# Patient Record
Sex: FEMALE | Race: OTHER | NOT HISPANIC OR LATINO | URBAN - METROPOLITAN AREA
[De-identification: names, ages, dates, MRNs, and addresses within clinical notes are randomized per-mention and may not be internally consistent; named-entity substitution may affect disease eponyms.]

---

## 2018-07-05 ENCOUNTER — OUTPATIENT (OUTPATIENT)
Dept: OUTPATIENT SERVICES | Facility: HOSPITAL | Age: 33
LOS: 1 days | End: 2018-07-05
Payer: MEDICAID

## 2018-07-05 ENCOUNTER — APPOINTMENT (OUTPATIENT)
Dept: ULTRASOUND IMAGING | Facility: CLINIC | Age: 33
End: 2018-07-05

## 2018-07-05 DIAGNOSIS — Z00.8 ENCOUNTER FOR OTHER GENERAL EXAMINATION: ICD-10-CM

## 2018-07-05 PROCEDURE — 76830 TRANSVAGINAL US NON-OB: CPT | Mod: 26

## 2018-07-05 PROCEDURE — 76856 US EXAM PELVIC COMPLETE: CPT | Mod: 26

## 2018-07-05 PROCEDURE — 76830 TRANSVAGINAL US NON-OB: CPT

## 2018-07-05 PROCEDURE — 76856 US EXAM PELVIC COMPLETE: CPT

## 2019-04-30 ENCOUNTER — EMERGENCY (EMERGENCY)
Facility: HOSPITAL | Age: 34
LOS: 0 days | Discharge: HOME | End: 2019-05-01
Attending: EMERGENCY MEDICINE | Admitting: EMERGENCY MEDICINE
Payer: MEDICAID

## 2019-04-30 VITALS
SYSTOLIC BLOOD PRESSURE: 186 MMHG | RESPIRATION RATE: 18 BRPM | DIASTOLIC BLOOD PRESSURE: 100 MMHG | OXYGEN SATURATION: 98 % | TEMPERATURE: 100 F | HEART RATE: 73 BPM

## 2019-04-30 DIAGNOSIS — R07.89 OTHER CHEST PAIN: ICD-10-CM

## 2019-04-30 DIAGNOSIS — R07.9 CHEST PAIN, UNSPECIFIED: ICD-10-CM

## 2019-04-30 LAB
ALBUMIN SERPL ELPH-MCNC: 4 G/DL — SIGNIFICANT CHANGE UP (ref 3.3–5)
ALP SERPL-CCNC: 110 U/L — SIGNIFICANT CHANGE UP (ref 40–120)
ALT FLD-CCNC: 36 U/L — SIGNIFICANT CHANGE UP (ref 12–78)
ANION GAP SERPL CALC-SCNC: 6 MMOL/L — SIGNIFICANT CHANGE UP (ref 5–17)
APTT BLD: 31.2 SEC — SIGNIFICANT CHANGE UP (ref 27.5–36.3)
AST SERPL-CCNC: 24 U/L — SIGNIFICANT CHANGE UP (ref 15–37)
BILIRUB SERPL-MCNC: 0.3 MG/DL — SIGNIFICANT CHANGE UP (ref 0.2–1.2)
BUN SERPL-MCNC: 11 MG/DL — SIGNIFICANT CHANGE UP (ref 7–23)
CALCIUM SERPL-MCNC: 8.1 MG/DL — LOW (ref 8.5–10.1)
CHLORIDE SERPL-SCNC: 105 MMOL/L — SIGNIFICANT CHANGE UP (ref 96–108)
CO2 SERPL-SCNC: 26 MMOL/L — SIGNIFICANT CHANGE UP (ref 22–31)
CREAT SERPL-MCNC: 0.65 MG/DL — SIGNIFICANT CHANGE UP (ref 0.5–1.3)
GLUCOSE SERPL-MCNC: 100 MG/DL — HIGH (ref 70–99)
HCG SERPL-ACNC: <1 MIU/ML — SIGNIFICANT CHANGE UP
HCT VFR BLD CALC: 38.4 % — SIGNIFICANT CHANGE UP (ref 34.5–45)
HGB BLD-MCNC: 12.4 G/DL — SIGNIFICANT CHANGE UP (ref 11.5–15.5)
INR BLD: 1.05 RATIO — SIGNIFICANT CHANGE UP (ref 0.88–1.16)
LIDOCAIN IGE QN: 154 U/L — SIGNIFICANT CHANGE UP (ref 73–393)
MCHC RBC-ENTMCNC: 28.5 PG — SIGNIFICANT CHANGE UP (ref 27–34)
MCHC RBC-ENTMCNC: 32.3 GM/DL — SIGNIFICANT CHANGE UP (ref 32–36)
MCV RBC AUTO: 88.3 FL — SIGNIFICANT CHANGE UP (ref 80–100)
NRBC # BLD: 0 /100 WBCS — SIGNIFICANT CHANGE UP (ref 0–0)
PLATELET # BLD AUTO: 252 K/UL — SIGNIFICANT CHANGE UP (ref 150–400)
POTASSIUM SERPL-MCNC: 3.5 MMOL/L — SIGNIFICANT CHANGE UP (ref 3.5–5.3)
POTASSIUM SERPL-SCNC: 3.5 MMOL/L — SIGNIFICANT CHANGE UP (ref 3.5–5.3)
PROT SERPL-MCNC: 8.4 GM/DL — HIGH (ref 6–8.3)
PROTHROM AB SERPL-ACNC: 11.7 SEC — SIGNIFICANT CHANGE UP (ref 10–12.9)
RBC # BLD: 4.35 M/UL — SIGNIFICANT CHANGE UP (ref 3.8–5.2)
RBC # FLD: 14.6 % — HIGH (ref 10.3–14.5)
SODIUM SERPL-SCNC: 137 MMOL/L — SIGNIFICANT CHANGE UP (ref 135–145)
TROPONIN I SERPL-MCNC: <0.015 NG/ML — SIGNIFICANT CHANGE UP (ref 0.01–0.04)
WBC # BLD: 9.3 K/UL — SIGNIFICANT CHANGE UP (ref 3.8–10.5)
WBC # FLD AUTO: 9.3 K/UL — SIGNIFICANT CHANGE UP (ref 3.8–10.5)

## 2019-04-30 PROCEDURE — 71046 X-RAY EXAM CHEST 2 VIEWS: CPT | Mod: 26

## 2019-04-30 PROCEDURE — 99285 EMERGENCY DEPT VISIT HI MDM: CPT | Mod: 25

## 2019-04-30 PROCEDURE — 93010 ELECTROCARDIOGRAM REPORT: CPT

## 2019-04-30 RX ORDER — KETOROLAC TROMETHAMINE 30 MG/ML
30 SYRINGE (ML) INJECTION ONCE
Refills: 0 | Status: DISCONTINUED | OUTPATIENT
Start: 2019-04-30 | End: 2019-04-30

## 2019-04-30 NOTE — ED PROVIDER NOTE - OBJECTIVE STATEMENT
32 y/o female in ED c/o left upper chest/shoulder pain x 1 day.   pt states increase pain with movement and palpation.   pt denies any trauma.    states took advil with intermittent relief.   pt denies any fever, HA, sob, n/v/d/abd pain.    tolerating PO.   no sick contacts or recent travel.

## 2019-04-30 NOTE — ED PROVIDER NOTE - MUSCULOSKELETAL MINIMAL EXAM
mild TTP of left chest wall and left shoulder.   decrease ROM of LUE due to pain./RANGE OF MOTION LIMITED/TENDERNESS

## 2019-04-30 NOTE — ED PROVIDER NOTE - PROGRESS NOTE DETAILS
pt and family told of results and likely MS pain.   states feels better and agree with plan for d/c home with scripts and will f/u with PMD.

## 2019-04-30 NOTE — ED PROVIDER NOTE - NSFOLLOWUPCLINICS_GEN_ALL_ED_FT
[facility]  Family Medicine  [address] [place] [address]  Phone: [phone]  Fax:   Follow Up Time:

## 2019-04-30 NOTE — ED PROVIDER NOTE - NSFOLLOWUPINSTRUCTIONS_ED_ALL_ED_FT
please follow up with your doctor in 2-3 days.   take medications as prescribed.   may use ice or heating pads for comfort.   return to ED for any concerns

## 2019-04-30 NOTE — ED ADULT TRIAGE NOTE - CHIEF COMPLAINT QUOTE
pt c/o left sided chest and shoulder pain starting around noon today. denies SOB, palpitations, trauma. pt took Advil at 5p

## 2019-04-30 NOTE — ED PROVIDER NOTE - CLINICAL SUMMARY MEDICAL DECISION MAKING FREE TEXT BOX
pt with left sided CP and shoulder pain possible cardiac vs MS pain.   will check EKG, CXR, labs, meds, UA and reeval

## 2019-05-01 VITALS
OXYGEN SATURATION: 100 % | RESPIRATION RATE: 17 BRPM | SYSTOLIC BLOOD PRESSURE: 122 MMHG | HEART RATE: 75 BPM | DIASTOLIC BLOOD PRESSURE: 70 MMHG

## 2019-05-01 LAB
APPEARANCE UR: CLEAR — SIGNIFICANT CHANGE UP
BACTERIA # UR AUTO: NEGATIVE — SIGNIFICANT CHANGE UP
BILIRUB UR-MCNC: NEGATIVE — SIGNIFICANT CHANGE UP
COLOR SPEC: YELLOW — SIGNIFICANT CHANGE UP
DIFF PNL FLD: ABNORMAL
EPI CELLS # UR: SIGNIFICANT CHANGE UP
GLUCOSE UR QL: NEGATIVE MG/DL — SIGNIFICANT CHANGE UP
KETONES UR-MCNC: NEGATIVE — SIGNIFICANT CHANGE UP
LEUKOCYTE ESTERASE UR-ACNC: NEGATIVE — SIGNIFICANT CHANGE UP
NITRITE UR-MCNC: NEGATIVE — SIGNIFICANT CHANGE UP
PH UR: 6.5 — SIGNIFICANT CHANGE UP (ref 5–8)
PROT UR-MCNC: 15 MG/DL
RBC CASTS # UR COMP ASSIST: SIGNIFICANT CHANGE UP /HPF (ref 0–4)
SP GR SPEC: 1.01 — SIGNIFICANT CHANGE UP (ref 1.01–1.02)
UROBILINOGEN FLD QL: NEGATIVE MG/DL — SIGNIFICANT CHANGE UP
WBC UR QL: SIGNIFICANT CHANGE UP

## 2019-05-01 RX ORDER — IBUPROFEN 200 MG
1 TABLET ORAL
Qty: 40 | Refills: 0
Start: 2019-05-01 | End: 2019-05-10

## 2019-05-01 RX ORDER — CYCLOBENZAPRINE HYDROCHLORIDE 10 MG/1
1 TABLET, FILM COATED ORAL
Qty: 15 | Refills: 0
Start: 2019-05-01 | End: 2019-05-05

## 2019-05-01 RX ADMIN — Medication 30 MILLIGRAM(S): at 00:20

## 2019-05-01 NOTE — ED ADULT NURSE NOTE - OBJECTIVE STATEMENT
pt presents to ed c/o left shoulder/chest pain x 1 . pt endorses pain while moving. pt denies any trauma. pt denies SOB. denies nausea/denies vomiting. pt ambulatory, vitals stable and normotensive. pt alert and orientated x 3. respirations even and unlabored.

## 2019-05-01 NOTE — ED ADULT NURSE NOTE - NSIMPLEMENTINTERV_GEN_ALL_ED
Implemented All Universal Safety Interventions:  [place] to call system. Call bell, personal items and telephone within reach. Instruct patient to call for assistance. Room bathroom lighting operational. Non-slip footwear when patient is off stretcher. Physically safe environment: no spills, clutter or unnecessary equipment. Stretcher in lowest position, wheels locked, appropriate side rails in place.

## 2019-10-29 ENCOUNTER — RESULT REVIEW (OUTPATIENT)
Age: 34
End: 2019-10-29

## 2019-11-15 ENCOUNTER — RESULT REVIEW (OUTPATIENT)
Age: 34
End: 2019-11-15

## 2020-01-28 ENCOUNTER — APPOINTMENT (OUTPATIENT)
Dept: ANTEPARTUM | Facility: CLINIC | Age: 35
End: 2020-01-28

## 2020-02-11 ENCOUNTER — ASOB RESULT (OUTPATIENT)
Age: 35
End: 2020-02-11

## 2020-02-11 ENCOUNTER — APPOINTMENT (OUTPATIENT)
Dept: ANTEPARTUM | Facility: CLINIC | Age: 35
End: 2020-02-11
Payer: MEDICAID

## 2020-02-11 PROCEDURE — 99201 OFFICE OUTPATIENT NEW 10 MINUTES: CPT | Mod: 25,TH

## 2020-02-11 PROCEDURE — 76813 OB US NUCHAL MEAS 1 GEST: CPT

## 2020-02-11 PROCEDURE — 76801 OB US < 14 WKS SINGLE FETUS: CPT

## 2020-03-31 ENCOUNTER — ASOB RESULT (OUTPATIENT)
Age: 35
End: 2020-03-31

## 2020-03-31 ENCOUNTER — APPOINTMENT (OUTPATIENT)
Dept: ANTEPARTUM | Facility: CLINIC | Age: 35
End: 2020-03-31
Payer: MEDICAID

## 2020-03-31 PROCEDURE — 76811 OB US DETAILED SNGL FETUS: CPT

## 2020-03-31 PROCEDURE — 99213 OFFICE O/P EST LOW 20 MIN: CPT | Mod: TH

## 2020-04-15 ENCOUNTER — EMERGENCY (EMERGENCY)
Facility: HOSPITAL | Age: 35
LOS: 0 days | Discharge: HOME | End: 2020-04-15
Payer: MEDICAID

## 2020-04-15 VITALS
TEMPERATURE: 99 F | RESPIRATION RATE: 18 BRPM | HEART RATE: 107 BPM | DIASTOLIC BLOOD PRESSURE: 66 MMHG | SYSTOLIC BLOOD PRESSURE: 120 MMHG

## 2020-04-15 DIAGNOSIS — R05 COUGH: ICD-10-CM

## 2020-04-15 DIAGNOSIS — R50.9 FEVER, UNSPECIFIED: ICD-10-CM

## 2020-04-15 DIAGNOSIS — U07.1 COVID-19: ICD-10-CM

## 2020-04-15 PROCEDURE — 99283 EMERGENCY DEPT VISIT LOW MDM: CPT

## 2020-04-15 PROCEDURE — 87635 SARS-COV-2 COVID-19 AMP PRB: CPT

## 2020-04-15 NOTE — ED STATDOCS - OBJECTIVE STATEMENT
35 yo female presents to ED with fever t max 100.3-100.5F, cough, body aches x 7 days. No recent exposure. Pt here for testing.

## 2020-04-15 NOTE — ED ADULT NURSE NOTE - OBJECTIVE STATEMENT
Patient evaluated, treated, and discharged by PA. Refer to PA note for assessment.  Discharge instructions given verbally and understood by patient. Self-quarantine and COVID-19 information provided to patient. Unable to sign secondary to COVID-19 PUI.  Patient provides verbal consent to receive results via text or email.

## 2020-04-15 NOTE — ED STATDOCS - PATIENT PORTAL LINK FT
You can access the FollowMyHealth Patient Portal offered by [facility] by registering at the following website: [email or web address]. By joining [facility]’s FollowMyHealth portal, you will also be able to view your health information using other applications (apps) compatible with our system.

## 2020-04-16 LAB — SARS-COV-2 RNA SPEC QL NAA+PROBE: DETECTED

## 2020-06-09 ENCOUNTER — APPOINTMENT (OUTPATIENT)
Dept: ANTEPARTUM | Facility: CLINIC | Age: 35
End: 2020-06-09
Payer: MEDICAID

## 2020-06-09 ENCOUNTER — ASOB RESULT (OUTPATIENT)
Age: 35
End: 2020-06-09

## 2020-06-09 PROCEDURE — 76816 OB US FOLLOW-UP PER FETUS: CPT

## 2020-06-09 PROCEDURE — 76820 UMBILICAL ARTERY ECHO: CPT

## 2020-06-30 ENCOUNTER — ASOB RESULT (OUTPATIENT)
Age: 35
End: 2020-06-30

## 2020-06-30 ENCOUNTER — APPOINTMENT (OUTPATIENT)
Dept: ANTEPARTUM | Facility: CLINIC | Age: 35
End: 2020-06-30
Payer: MEDICAID

## 2020-06-30 PROCEDURE — 76820 UMBILICAL ARTERY ECHO: CPT

## 2020-06-30 PROCEDURE — 76816 OB US FOLLOW-UP PER FETUS: CPT

## 2020-07-13 ENCOUNTER — INPATIENT (INPATIENT)
Facility: HOSPITAL | Age: 35
LOS: 3 days | Discharge: HOME | DRG: 540 | End: 2020-07-17
Attending: OBSTETRICS & GYNECOLOGY | Admitting: OBSTETRICS & GYNECOLOGY
Payer: MEDICAID

## 2020-07-13 VITALS — WEIGHT: 211.64 LBS | HEIGHT: 61 IN

## 2020-07-13 DIAGNOSIS — O26.899 OTHER SPECIFIED PREGNANCY RELATED CONDITIONS, UNSPECIFIED TRIMESTER: ICD-10-CM

## 2020-07-13 LAB
ALBUMIN SERPL ELPH-MCNC: 2.8 G/DL — LOW (ref 3.3–5)
ALBUMIN SERPL ELPH-MCNC: 2.8 G/DL — LOW (ref 3.3–5)
ALP SERPL-CCNC: 100 U/L — SIGNIFICANT CHANGE UP (ref 40–120)
ALP SERPL-CCNC: 97 U/L — SIGNIFICANT CHANGE UP (ref 40–120)
ALT FLD-CCNC: 16 U/L — SIGNIFICANT CHANGE UP (ref 12–78)
ALT FLD-CCNC: 22 U/L — SIGNIFICANT CHANGE UP (ref 12–78)
ANION GAP SERPL CALC-SCNC: 6 MMOL/L — SIGNIFICANT CHANGE UP (ref 5–17)
ANION GAP SERPL CALC-SCNC: 7 MMOL/L — SIGNIFICANT CHANGE UP (ref 5–17)
APTT BLD: 25.8 SEC — LOW (ref 27.5–35.5)
APTT BLD: 26.2 SEC — LOW (ref 27.5–35.5)
AST SERPL-CCNC: 14 U/L — LOW (ref 15–37)
AST SERPL-CCNC: 15 U/L — SIGNIFICANT CHANGE UP (ref 15–37)
BASOPHILS # BLD AUTO: 0.01 K/UL — SIGNIFICANT CHANGE UP (ref 0–0.2)
BASOPHILS # BLD AUTO: 0.01 K/UL — SIGNIFICANT CHANGE UP (ref 0–0.2)
BASOPHILS NFR BLD AUTO: 0.1 % — SIGNIFICANT CHANGE UP (ref 0–2)
BASOPHILS NFR BLD AUTO: 0.1 % — SIGNIFICANT CHANGE UP (ref 0–2)
BILIRUB SERPL-MCNC: 0.1 MG/DL — LOW (ref 0.2–1.2)
BILIRUB SERPL-MCNC: 0.2 MG/DL — SIGNIFICANT CHANGE UP (ref 0.2–1.2)
BUN SERPL-MCNC: 11 MG/DL — SIGNIFICANT CHANGE UP (ref 7–23)
BUN SERPL-MCNC: 7 MG/DL — SIGNIFICANT CHANGE UP (ref 7–23)
CALCIUM SERPL-MCNC: 7.9 MG/DL — LOW (ref 8.5–10.1)
CALCIUM SERPL-MCNC: 8.3 MG/DL — LOW (ref 8.5–10.1)
CHLORIDE SERPL-SCNC: 107 MMOL/L — SIGNIFICANT CHANGE UP (ref 96–108)
CHLORIDE SERPL-SCNC: 108 MMOL/L — SIGNIFICANT CHANGE UP (ref 96–108)
CO2 SERPL-SCNC: 22 MMOL/L — SIGNIFICANT CHANGE UP (ref 22–31)
CO2 SERPL-SCNC: 23 MMOL/L — SIGNIFICANT CHANGE UP (ref 22–31)
CREAT ?TM UR-MCNC: 19 MG/DL — SIGNIFICANT CHANGE UP
CREAT SERPL-MCNC: 0.43 MG/DL — LOW (ref 0.5–1.3)
CREAT SERPL-MCNC: 0.54 MG/DL — SIGNIFICANT CHANGE UP (ref 0.5–1.3)
EOSINOPHIL # BLD AUTO: 0 K/UL — SIGNIFICANT CHANGE UP (ref 0–0.5)
EOSINOPHIL # BLD AUTO: 0.04 K/UL — SIGNIFICANT CHANGE UP (ref 0–0.5)
EOSINOPHIL NFR BLD AUTO: 0 % — SIGNIFICANT CHANGE UP (ref 0–6)
EOSINOPHIL NFR BLD AUTO: 0.4 % — SIGNIFICANT CHANGE UP (ref 0–6)
FIBRINOGEN PPP-MCNC: 644 MG/DL — HIGH (ref 290–520)
GLUCOSE SERPL-MCNC: 133 MG/DL — HIGH (ref 70–99)
GLUCOSE SERPL-MCNC: 66 MG/DL — LOW (ref 70–99)
HCT VFR BLD CALC: 33 % — LOW (ref 34.5–45)
HCT VFR BLD CALC: 33.9 % — LOW (ref 34.5–45)
HGB BLD-MCNC: 11.3 G/DL — LOW (ref 11.5–15.5)
HGB BLD-MCNC: 11.5 G/DL — SIGNIFICANT CHANGE UP (ref 11.5–15.5)
IMM GRANULOCYTES NFR BLD AUTO: 0.4 % — SIGNIFICANT CHANGE UP (ref 0–1.5)
IMM GRANULOCYTES NFR BLD AUTO: 0.8 % — SIGNIFICANT CHANGE UP (ref 0–1.5)
INR BLD: 0.92 RATIO — SIGNIFICANT CHANGE UP (ref 0.88–1.16)
INR BLD: 0.94 RATIO — SIGNIFICANT CHANGE UP (ref 0.88–1.16)
LDH SERPL L TO P-CCNC: 193 U/L — SIGNIFICANT CHANGE UP (ref 84–241)
LYMPHOCYTES # BLD AUTO: 0.71 K/UL — LOW (ref 1–3.3)
LYMPHOCYTES # BLD AUTO: 1.41 K/UL — SIGNIFICANT CHANGE UP (ref 1–3.3)
LYMPHOCYTES # BLD AUTO: 15.8 % — SIGNIFICANT CHANGE UP (ref 13–44)
LYMPHOCYTES # BLD AUTO: 6 % — LOW (ref 13–44)
MCHC RBC-ENTMCNC: 30 PG — SIGNIFICANT CHANGE UP (ref 27–34)
MCHC RBC-ENTMCNC: 30.3 PG — SIGNIFICANT CHANGE UP (ref 27–34)
MCHC RBC-ENTMCNC: 33.9 GM/DL — SIGNIFICANT CHANGE UP (ref 32–36)
MCHC RBC-ENTMCNC: 34.2 GM/DL — SIGNIFICANT CHANGE UP (ref 32–36)
MCV RBC AUTO: 88.5 FL — SIGNIFICANT CHANGE UP (ref 80–100)
MCV RBC AUTO: 88.5 FL — SIGNIFICANT CHANGE UP (ref 80–100)
MONOCYTES # BLD AUTO: 0.19 K/UL — SIGNIFICANT CHANGE UP (ref 0–0.9)
MONOCYTES # BLD AUTO: 0.83 K/UL — SIGNIFICANT CHANGE UP (ref 0–0.9)
MONOCYTES NFR BLD AUTO: 1.6 % — LOW (ref 2–14)
MONOCYTES NFR BLD AUTO: 9.3 % — SIGNIFICANT CHANGE UP (ref 2–14)
NEUTROPHILS # BLD AUTO: 10.76 K/UL — HIGH (ref 1.8–7.4)
NEUTROPHILS # BLD AUTO: 6.62 K/UL — SIGNIFICANT CHANGE UP (ref 1.8–7.4)
NEUTROPHILS NFR BLD AUTO: 74 % — SIGNIFICANT CHANGE UP (ref 43–77)
NEUTROPHILS NFR BLD AUTO: 91.5 % — HIGH (ref 43–77)
PLATELET # BLD AUTO: 199 K/UL — SIGNIFICANT CHANGE UP (ref 150–400)
PLATELET # BLD AUTO: 205 K/UL — SIGNIFICANT CHANGE UP (ref 150–400)
POTASSIUM SERPL-MCNC: 3.9 MMOL/L — SIGNIFICANT CHANGE UP (ref 3.5–5.3)
POTASSIUM SERPL-MCNC: 4 MMOL/L — SIGNIFICANT CHANGE UP (ref 3.5–5.3)
POTASSIUM SERPL-SCNC: 3.9 MMOL/L — SIGNIFICANT CHANGE UP (ref 3.5–5.3)
POTASSIUM SERPL-SCNC: 4 MMOL/L — SIGNIFICANT CHANGE UP (ref 3.5–5.3)
PROT ?TM UR-MCNC: 42 MG/DL — HIGH (ref 0–12)
PROT SERPL-MCNC: 7.1 GM/DL — SIGNIFICANT CHANGE UP (ref 6–8.3)
PROT SERPL-MCNC: 7.1 GM/DL — SIGNIFICANT CHANGE UP (ref 6–8.3)
PROT/CREAT UR-RTO: 2.2 RATIO — HIGH (ref 0–0.2)
PROTHROM AB SERPL-ACNC: 10.8 SEC — SIGNIFICANT CHANGE UP (ref 10.6–13.6)
PROTHROM AB SERPL-ACNC: 11 SEC — SIGNIFICANT CHANGE UP (ref 10.6–13.6)
RBC # BLD: 3.73 M/UL — LOW (ref 3.8–5.2)
RBC # BLD: 3.83 M/UL — SIGNIFICANT CHANGE UP (ref 3.8–5.2)
RBC # FLD: 14.2 % — SIGNIFICANT CHANGE UP (ref 10.3–14.5)
RBC # FLD: 14.3 % — SIGNIFICANT CHANGE UP (ref 10.3–14.5)
SARS-COV-2 RNA SPEC QL NAA+PROBE: SIGNIFICANT CHANGE UP
SODIUM SERPL-SCNC: 136 MMOL/L — SIGNIFICANT CHANGE UP (ref 135–145)
SODIUM SERPL-SCNC: 137 MMOL/L — SIGNIFICANT CHANGE UP (ref 135–145)
T PALLIDUM AB TITR SER: NEGATIVE — SIGNIFICANT CHANGE UP
URATE SERPL-MCNC: 4 MG/DL — SIGNIFICANT CHANGE UP (ref 2.5–7)
URATE SERPL-MCNC: 4.9 MG/DL — SIGNIFICANT CHANGE UP (ref 2.5–7)
WBC # BLD: 11.77 K/UL — HIGH (ref 3.8–10.5)
WBC # BLD: 8.95 K/UL — SIGNIFICANT CHANGE UP (ref 3.8–10.5)
WBC # FLD AUTO: 11.77 K/UL — HIGH (ref 3.8–10.5)
WBC # FLD AUTO: 8.95 K/UL — SIGNIFICANT CHANGE UP (ref 3.8–10.5)

## 2020-07-13 PROCEDURE — 84156 ASSAY OF PROTEIN URINE: CPT

## 2020-07-13 PROCEDURE — 80053 COMPREHEN METABOLIC PANEL: CPT

## 2020-07-13 PROCEDURE — 86901 BLOOD TYPING SEROLOGIC RH(D): CPT

## 2020-07-13 PROCEDURE — 85384 FIBRINOGEN ACTIVITY: CPT

## 2020-07-13 PROCEDURE — 85730 THROMBOPLASTIN TIME PARTIAL: CPT

## 2020-07-13 PROCEDURE — 85610 PROTHROMBIN TIME: CPT

## 2020-07-13 PROCEDURE — 86850 RBC ANTIBODY SCREEN: CPT

## 2020-07-13 PROCEDURE — 59050 FETAL MONITOR W/REPORT: CPT

## 2020-07-13 PROCEDURE — 84550 ASSAY OF BLOOD/URIC ACID: CPT

## 2020-07-13 PROCEDURE — 94760 N-INVAS EAR/PLS OXIMETRY 1: CPT

## 2020-07-13 PROCEDURE — 83615 LACTATE (LD) (LDH) ENZYME: CPT

## 2020-07-13 PROCEDURE — 86780 TREPONEMA PALLIDUM: CPT

## 2020-07-13 PROCEDURE — 82570 ASSAY OF URINE CREATININE: CPT

## 2020-07-13 PROCEDURE — 85025 COMPLETE CBC W/AUTO DIFF WBC: CPT

## 2020-07-13 PROCEDURE — 83735 ASSAY OF MAGNESIUM: CPT

## 2020-07-13 PROCEDURE — 87653 STREP B DNA AMP PROBE: CPT

## 2020-07-13 PROCEDURE — 36415 COLL VENOUS BLD VENIPUNCTURE: CPT

## 2020-07-13 PROCEDURE — G0463: CPT

## 2020-07-13 PROCEDURE — 87635 SARS-COV-2 COVID-19 AMP PRB: CPT

## 2020-07-13 PROCEDURE — 86900 BLOOD TYPING SEROLOGIC ABO: CPT

## 2020-07-13 PROCEDURE — 86769 SARS-COV-2 COVID-19 ANTIBODY: CPT

## 2020-07-13 PROCEDURE — 87081 CULTURE SCREEN ONLY: CPT

## 2020-07-13 RX ORDER — LABETALOL HCL 100 MG
20 TABLET ORAL ONCE
Refills: 0 | Status: COMPLETED | OUTPATIENT
Start: 2020-07-13 | End: 2020-07-13

## 2020-07-13 RX ORDER — ACETAMINOPHEN 500 MG
975 TABLET ORAL EVERY 8 HOURS
Refills: 0 | Status: DISCONTINUED | OUTPATIENT
Start: 2020-07-13 | End: 2020-07-17

## 2020-07-13 RX ORDER — LABETALOL HCL 100 MG
200 TABLET ORAL EVERY 8 HOURS
Refills: 0 | Status: DISCONTINUED | OUTPATIENT
Start: 2020-07-13 | End: 2020-07-13

## 2020-07-13 RX ORDER — SODIUM CHLORIDE 9 MG/ML
1000 INJECTION, SOLUTION INTRAVENOUS
Refills: 0 | Status: DISCONTINUED | OUTPATIENT
Start: 2020-07-13 | End: 2020-07-15

## 2020-07-13 RX ORDER — MAGNESIUM SULFATE 500 MG/ML
2 VIAL (ML) INJECTION
Qty: 40 | Refills: 0 | Status: DISCONTINUED | OUTPATIENT
Start: 2020-07-13 | End: 2020-07-15

## 2020-07-13 RX ORDER — DINOPROSTONE 10 MG/241MG
10 INSERT VAGINAL ONCE
Refills: 0 | Status: COMPLETED | OUTPATIENT
Start: 2020-07-13 | End: 2020-07-13

## 2020-07-13 RX ORDER — CITRIC ACID/SODIUM CITRATE 300-500 MG
30 SOLUTION, ORAL ORAL EVERY 6 HOURS
Refills: 0 | Status: DISCONTINUED | OUTPATIENT
Start: 2020-07-13 | End: 2020-07-15

## 2020-07-13 RX ORDER — OXYTOCIN 10 UNIT/ML
333.33 VIAL (ML) INJECTION
Qty: 20 | Refills: 0 | Status: DISCONTINUED | OUTPATIENT
Start: 2020-07-13 | End: 2020-07-17

## 2020-07-13 RX ORDER — MAGNESIUM SULFATE 500 MG/ML
4 VIAL (ML) INJECTION ONCE
Refills: 0 | Status: COMPLETED | OUTPATIENT
Start: 2020-07-13 | End: 2020-07-13

## 2020-07-13 RX ADMIN — Medication 50 GM/HR: at 15:52

## 2020-07-13 RX ADMIN — Medication 200 MILLIGRAM(S): at 21:39

## 2020-07-13 RX ADMIN — Medication 325 MILLIGRAM(S): at 20:00

## 2020-07-13 RX ADMIN — Medication 12 MILLIGRAM(S): at 15:02

## 2020-07-13 RX ADMIN — Medication 200 GRAM(S): at 13:35

## 2020-07-13 RX ADMIN — SODIUM CHLORIDE 75 MILLILITER(S): 9 INJECTION, SOLUTION INTRAVENOUS at 15:51

## 2020-07-13 RX ADMIN — Medication 20 MILLIGRAM(S): at 13:33

## 2020-07-13 RX ADMIN — Medication 20 MILLIGRAM(S): at 13:03

## 2020-07-14 LAB
COLLECT DURATION TIME UR: 24 HR — SIGNIFICANT CHANGE UP
MAGNESIUM SERPL-MCNC: 4.7 MG/DL — HIGH (ref 1.6–2.6)
MAGNESIUM SERPL-MCNC: 4.8 MG/DL — HIGH (ref 1.6–2.6)
MAGNESIUM SERPL-MCNC: 4.9 MG/DL — HIGH (ref 1.6–2.6)
PROT 24H UR-MRATE: 800 MG/24 H — HIGH (ref 50–100)
TOTAL VOLUME - 24 HOUR: 1380 ML — SIGNIFICANT CHANGE UP
URINE CREATININE CALCULATION: 1.2 G/24 H — SIGNIFICANT CHANGE UP (ref 0.8–1.8)

## 2020-07-14 RX ORDER — LABETALOL HCL 100 MG
20 TABLET ORAL ONCE
Refills: 0 | Status: COMPLETED | OUTPATIENT
Start: 2020-07-14 | End: 2020-07-14

## 2020-07-14 RX ADMIN — Medication 975 MILLIGRAM(S): at 05:42

## 2020-07-14 RX ADMIN — Medication 50 GM/HR: at 19:48

## 2020-07-14 RX ADMIN — Medication 50 GM/HR: at 09:14

## 2020-07-14 RX ADMIN — Medication 20 MILLIGRAM(S): at 10:52

## 2020-07-14 RX ADMIN — DINOPROSTONE 10 MILLIGRAM(S): 10 INSERT VAGINAL at 00:55

## 2020-07-14 RX ADMIN — Medication 12 MILLIGRAM(S): at 16:33

## 2020-07-14 RX ADMIN — SODIUM CHLORIDE 75 MILLILITER(S): 9 INJECTION, SOLUTION INTRAVENOUS at 19:48

## 2020-07-14 RX ADMIN — SODIUM CHLORIDE 75 MILLILITER(S): 9 INJECTION, SOLUTION INTRAVENOUS at 00:49

## 2020-07-15 LAB
ALBUMIN SERPL ELPH-MCNC: 2.8 G/DL — LOW (ref 3.3–5)
ALP SERPL-CCNC: 99 U/L — SIGNIFICANT CHANGE UP (ref 40–120)
ALT FLD-CCNC: 20 U/L — SIGNIFICANT CHANGE UP (ref 12–78)
ANION GAP SERPL CALC-SCNC: 7 MMOL/L — SIGNIFICANT CHANGE UP (ref 5–17)
APTT BLD: 23.7 SEC — LOW (ref 27.5–35.5)
AST SERPL-CCNC: 15 U/L — SIGNIFICANT CHANGE UP (ref 15–37)
BASOPHILS # BLD AUTO: 0.02 K/UL — SIGNIFICANT CHANGE UP (ref 0–0.2)
BASOPHILS # BLD AUTO: 0.02 K/UL — SIGNIFICANT CHANGE UP (ref 0–0.2)
BASOPHILS NFR BLD AUTO: 0.1 % — SIGNIFICANT CHANGE UP (ref 0–2)
BASOPHILS NFR BLD AUTO: 0.2 % — SIGNIFICANT CHANGE UP (ref 0–2)
BILIRUB SERPL-MCNC: 0.1 MG/DL — LOW (ref 0.2–1.2)
BUN SERPL-MCNC: 12 MG/DL — SIGNIFICANT CHANGE UP (ref 7–23)
CALCIUM SERPL-MCNC: 7.2 MG/DL — LOW (ref 8.5–10.1)
CHLORIDE SERPL-SCNC: 107 MMOL/L — SIGNIFICANT CHANGE UP (ref 96–108)
CO2 SERPL-SCNC: 22 MMOL/L — SIGNIFICANT CHANGE UP (ref 22–31)
CREAT SERPL-MCNC: 0.66 MG/DL — SIGNIFICANT CHANGE UP (ref 0.5–1.3)
EOSINOPHIL # BLD AUTO: 0 K/UL — SIGNIFICANT CHANGE UP (ref 0–0.5)
EOSINOPHIL # BLD AUTO: 0.01 K/UL — SIGNIFICANT CHANGE UP (ref 0–0.5)
EOSINOPHIL NFR BLD AUTO: 0 % — SIGNIFICANT CHANGE UP (ref 0–6)
EOSINOPHIL NFR BLD AUTO: 0.1 % — SIGNIFICANT CHANGE UP (ref 0–6)
FIBRINOGEN PPP-MCNC: 618 MG/DL — HIGH (ref 290–520)
GLUCOSE SERPL-MCNC: 116 MG/DL — HIGH (ref 70–99)
HCT VFR BLD CALC: 32.1 % — LOW (ref 34.5–45)
HCT VFR BLD CALC: 33.7 % — LOW (ref 34.5–45)
HGB BLD-MCNC: 10.5 G/DL — LOW (ref 11.5–15.5)
HGB BLD-MCNC: 11.3 G/DL — LOW (ref 11.5–15.5)
IMM GRANULOCYTES NFR BLD AUTO: 0.8 % — SIGNIFICANT CHANGE UP (ref 0–1.5)
IMM GRANULOCYTES NFR BLD AUTO: 0.8 % — SIGNIFICANT CHANGE UP (ref 0–1.5)
INR BLD: 0.89 RATIO — SIGNIFICANT CHANGE UP (ref 0.88–1.16)
LDH SERPL L TO P-CCNC: 189 U/L — SIGNIFICANT CHANGE UP (ref 84–241)
LYMPHOCYTES # BLD AUTO: 0.89 K/UL — LOW (ref 1–3.3)
LYMPHOCYTES # BLD AUTO: 1 K/UL — SIGNIFICANT CHANGE UP (ref 1–3.3)
LYMPHOCYTES # BLD AUTO: 6.3 % — LOW (ref 13–44)
LYMPHOCYTES # BLD AUTO: 6.9 % — LOW (ref 13–44)
MAGNESIUM SERPL-MCNC: 4.6 MG/DL — HIGH (ref 1.6–2.6)
MAGNESIUM SERPL-MCNC: 4.7 MG/DL — HIGH (ref 1.6–2.6)
MAGNESIUM SERPL-MCNC: 4.8 MG/DL — HIGH (ref 1.6–2.6)
MAGNESIUM SERPL-MCNC: 4.9 MG/DL — HIGH (ref 1.6–2.6)
MCHC RBC-ENTMCNC: 30 PG — SIGNIFICANT CHANGE UP (ref 27–34)
MCHC RBC-ENTMCNC: 30.2 PG — SIGNIFICANT CHANGE UP (ref 27–34)
MCHC RBC-ENTMCNC: 32.7 GM/DL — SIGNIFICANT CHANGE UP (ref 32–36)
MCHC RBC-ENTMCNC: 33.5 GM/DL — SIGNIFICANT CHANGE UP (ref 32–36)
MCV RBC AUTO: 90.1 FL — SIGNIFICANT CHANGE UP (ref 80–100)
MCV RBC AUTO: 91.7 FL — SIGNIFICANT CHANGE UP (ref 80–100)
MONOCYTES # BLD AUTO: 0.67 K/UL — SIGNIFICANT CHANGE UP (ref 0–0.9)
MONOCYTES # BLD AUTO: 1.42 K/UL — HIGH (ref 0–0.9)
MONOCYTES NFR BLD AUTO: 5.2 % — SIGNIFICANT CHANGE UP (ref 2–14)
MONOCYTES NFR BLD AUTO: 9 % — SIGNIFICANT CHANGE UP (ref 2–14)
NEUTROPHILS # BLD AUTO: 11.28 K/UL — HIGH (ref 1.8–7.4)
NEUTROPHILS # BLD AUTO: 13.22 K/UL — HIGH (ref 1.8–7.4)
NEUTROPHILS NFR BLD AUTO: 83.7 % — HIGH (ref 43–77)
NEUTROPHILS NFR BLD AUTO: 86.9 % — HIGH (ref 43–77)
PLATELET # BLD AUTO: 201 K/UL — SIGNIFICANT CHANGE UP (ref 150–400)
PLATELET # BLD AUTO: 211 K/UL — SIGNIFICANT CHANGE UP (ref 150–400)
POTASSIUM SERPL-MCNC: 4.6 MMOL/L — SIGNIFICANT CHANGE UP (ref 3.5–5.3)
POTASSIUM SERPL-SCNC: 4.6 MMOL/L — SIGNIFICANT CHANGE UP (ref 3.5–5.3)
PROT SERPL-MCNC: 7.3 GM/DL — SIGNIFICANT CHANGE UP (ref 6–8.3)
PROTHROM AB SERPL-ACNC: 10.4 SEC — LOW (ref 10.6–13.6)
RBC # BLD: 3.5 M/UL — LOW (ref 3.8–5.2)
RBC # BLD: 3.74 M/UL — LOW (ref 3.8–5.2)
RBC # FLD: 14.5 % — SIGNIFICANT CHANGE UP (ref 10.3–14.5)
RBC # FLD: 14.7 % — HIGH (ref 10.3–14.5)
SODIUM SERPL-SCNC: 136 MMOL/L — SIGNIFICANT CHANGE UP (ref 135–145)
URATE SERPL-MCNC: 5.1 MG/DL — SIGNIFICANT CHANGE UP (ref 2.5–7)
WBC # BLD: 12.97 K/UL — HIGH (ref 3.8–10.5)
WBC # BLD: 15.79 K/UL — HIGH (ref 3.8–10.5)
WBC # FLD AUTO: 12.97 K/UL — HIGH (ref 3.8–10.5)
WBC # FLD AUTO: 15.79 K/UL — HIGH (ref 3.8–10.5)

## 2020-07-15 RX ORDER — IBUPROFEN 200 MG
600 TABLET ORAL EVERY 6 HOURS
Refills: 0 | Status: COMPLETED | OUTPATIENT
Start: 2020-07-15 | End: 2021-06-13

## 2020-07-15 RX ORDER — LABETALOL HCL 100 MG
40 TABLET ORAL ONCE
Refills: 0 | Status: COMPLETED | OUTPATIENT
Start: 2020-07-15 | End: 2020-07-15

## 2020-07-15 RX ORDER — LABETALOL HCL 100 MG
200 TABLET ORAL EVERY 8 HOURS
Refills: 0 | Status: DISCONTINUED | OUTPATIENT
Start: 2020-07-15 | End: 2020-07-17

## 2020-07-15 RX ORDER — MAGNESIUM SULFATE 500 MG/ML
2 VIAL (ML) INJECTION
Qty: 40 | Refills: 0 | Status: DISCONTINUED | OUTPATIENT
Start: 2020-07-15 | End: 2020-07-16

## 2020-07-15 RX ORDER — LANOLIN
1 OINTMENT (GRAM) TOPICAL EVERY 6 HOURS
Refills: 0 | Status: DISCONTINUED | OUTPATIENT
Start: 2020-07-15 | End: 2020-07-17

## 2020-07-15 RX ORDER — SODIUM CHLORIDE 9 MG/ML
1000 INJECTION, SOLUTION INTRAVENOUS
Refills: 0 | Status: DISCONTINUED | OUTPATIENT
Start: 2020-07-15 | End: 2020-07-17

## 2020-07-15 RX ORDER — LABETALOL HCL 100 MG
20 TABLET ORAL ONCE
Refills: 0 | Status: COMPLETED | OUTPATIENT
Start: 2020-07-15 | End: 2020-07-15

## 2020-07-15 RX ORDER — ACETAMINOPHEN 500 MG
1000 TABLET ORAL ONCE
Refills: 0 | Status: COMPLETED | OUTPATIENT
Start: 2020-07-15 | End: 2020-07-15

## 2020-07-15 RX ORDER — OXYTOCIN 10 UNIT/ML
333.33 VIAL (ML) INJECTION
Qty: 20 | Refills: 0 | Status: DISCONTINUED | OUTPATIENT
Start: 2020-07-15 | End: 2020-07-17

## 2020-07-15 RX ORDER — MORPHINE SULFATE 50 MG/1
4 CAPSULE, EXTENDED RELEASE ORAL
Refills: 0 | Status: DISCONTINUED | OUTPATIENT
Start: 2020-07-15 | End: 2020-07-17

## 2020-07-15 RX ORDER — DIPHENHYDRAMINE HCL 50 MG
25 CAPSULE ORAL EVERY 6 HOURS
Refills: 0 | Status: DISCONTINUED | OUTPATIENT
Start: 2020-07-15 | End: 2020-07-17

## 2020-07-15 RX ORDER — ENOXAPARIN SODIUM 100 MG/ML
40 INJECTION SUBCUTANEOUS DAILY
Refills: 0 | Status: DISCONTINUED | OUTPATIENT
Start: 2020-07-16 | End: 2020-07-17

## 2020-07-15 RX ORDER — OXYCODONE HYDROCHLORIDE 5 MG/1
5 TABLET ORAL ONCE
Refills: 0 | Status: DISCONTINUED | OUTPATIENT
Start: 2020-07-15 | End: 2020-07-17

## 2020-07-15 RX ORDER — SIMETHICONE 80 MG/1
80 TABLET, CHEWABLE ORAL EVERY 4 HOURS
Refills: 0 | Status: DISCONTINUED | OUTPATIENT
Start: 2020-07-15 | End: 2020-07-17

## 2020-07-15 RX ORDER — AZITHROMYCIN 500 MG/1
500 TABLET, FILM COATED ORAL ONCE
Refills: 0 | Status: COMPLETED | OUTPATIENT
Start: 2020-07-15 | End: 2020-07-15

## 2020-07-15 RX ORDER — ACETAMINOPHEN 500 MG
975 TABLET ORAL
Refills: 0 | Status: DISCONTINUED | OUTPATIENT
Start: 2020-07-15 | End: 2020-07-17

## 2020-07-15 RX ORDER — HYDRALAZINE HCL 50 MG
10 TABLET ORAL ONCE
Refills: 0 | Status: COMPLETED | OUTPATIENT
Start: 2020-07-15 | End: 2020-07-15

## 2020-07-15 RX ORDER — MAGNESIUM HYDROXIDE 400 MG/1
30 TABLET, CHEWABLE ORAL
Refills: 0 | Status: DISCONTINUED | OUTPATIENT
Start: 2020-07-15 | End: 2020-07-17

## 2020-07-15 RX ORDER — TETANUS TOXOID, REDUCED DIPHTHERIA TOXOID AND ACELLULAR PERTUSSIS VACCINE, ADSORBED 5; 2.5; 8; 8; 2.5 [IU]/.5ML; [IU]/.5ML; UG/.5ML; UG/.5ML; UG/.5ML
0.5 SUSPENSION INTRAMUSCULAR ONCE
Refills: 0 | Status: DISCONTINUED | OUTPATIENT
Start: 2020-07-15 | End: 2020-07-17

## 2020-07-15 RX ORDER — OXYCODONE HYDROCHLORIDE 5 MG/1
5 TABLET ORAL
Refills: 0 | Status: DISCONTINUED | OUTPATIENT
Start: 2020-07-15 | End: 2020-07-17

## 2020-07-15 RX ORDER — KETOROLAC TROMETHAMINE 30 MG/ML
30 SYRINGE (ML) INJECTION EVERY 6 HOURS
Refills: 0 | Status: DISCONTINUED | OUTPATIENT
Start: 2020-07-15 | End: 2020-07-16

## 2020-07-15 RX ADMIN — Medication 30 MILLIGRAM(S): at 17:45

## 2020-07-15 RX ADMIN — Medication 400 MILLIGRAM(S): at 16:20

## 2020-07-15 RX ADMIN — Medication 200 MILLIGRAM(S): at 18:15

## 2020-07-15 RX ADMIN — Medication 975 MILLIGRAM(S): at 21:51

## 2020-07-15 RX ADMIN — Medication 200 MILLIGRAM(S): at 10:20

## 2020-07-15 RX ADMIN — Medication 30 MILLIGRAM(S): at 18:15

## 2020-07-15 RX ADMIN — Medication 30 MILLILITER(S): at 17:20

## 2020-07-15 RX ADMIN — Medication 30 MILLIGRAM(S): at 23:54

## 2020-07-15 RX ADMIN — Medication 50 GM/HR: at 18:16

## 2020-07-15 RX ADMIN — Medication 975 MILLIGRAM(S): at 21:04

## 2020-07-15 RX ADMIN — Medication 30 MILLIGRAM(S): at 23:56

## 2020-07-15 RX ADMIN — Medication 20 MILLIGRAM(S): at 08:30

## 2020-07-15 RX ADMIN — Medication 10 MILLIGRAM(S): at 10:56

## 2020-07-15 RX ADMIN — Medication 200 MILLIGRAM(S): at 23:55

## 2020-07-15 RX ADMIN — Medication 1000 MILLIUNIT(S)/MIN: at 18:16

## 2020-07-15 RX ADMIN — Medication 1000 MILLIGRAM(S): at 17:18

## 2020-07-15 RX ADMIN — AZITHROMYCIN 255 MILLIGRAM(S): 500 TABLET, FILM COATED ORAL at 16:20

## 2020-07-15 RX ADMIN — Medication 40 MILLIGRAM(S): at 08:30

## 2020-07-15 NOTE — CHART NOTE - NSCHARTNOTEFT_GEN_A_CORE
34 y/o now  POD#0 s/p primary  section at 34w1d for pre-eclampsia with severe features, on 24hr MgSO4 infusion therapy post partum.   Patient reports feeling well. Denies headache, dizziness, visual disturbances, SOB, and RUQ pain.     Vital Signs Last 24 Hrs  T(C): 36.8 (15 Jul 2020 13:45), Max: 36.8 (15 Jul 2020 13:45)  T(F): 98.2 (15 Jul 2020 13:45), Max: 98.2 (15 Jul 2020 13:45)  HR: 70 (15 Jul 2020 17:00) (67 - 85)  BP: 126/73 (15 Jul 2020 17:00) (108/64 - 129/79)  RR: 20 (15 Jul 2020 17:00) (18 - 20)  SpO2: 98% (15 Jul 2020 17:00) (96% - 98%)    Gen: NAD   Lungs: CTAB   Abdomen: appropriately tender to palpation, dressing in place   Ext: +2 brachioradialis DTR's bilaterally     Magnesium, Serum (07.15.20 @ 05:16)    Magnesium, Serum: 4.9 mg/dL    UOP: adequate 60+cc/hr since surgery     A/P: Continue MgSO4 infusion until tomorrow morning, 24hrs post partum.   Mg checks q4h and serum Mg levels q6h.   Continue routine post partum and post operative care.     d/w Dr. Mcconnell

## 2020-07-16 ENCOUNTER — TRANSCRIPTION ENCOUNTER (OUTPATIENT)
Age: 35
End: 2020-07-16

## 2020-07-16 LAB
BASOPHILS # BLD AUTO: 0.01 K/UL — SIGNIFICANT CHANGE UP (ref 0–0.2)
BASOPHILS NFR BLD AUTO: 0.1 % — SIGNIFICANT CHANGE UP (ref 0–2)
EOSINOPHIL # BLD AUTO: 0.01 K/UL — SIGNIFICANT CHANGE UP (ref 0–0.5)
EOSINOPHIL NFR BLD AUTO: 0.1 % — SIGNIFICANT CHANGE UP (ref 0–6)
HCT VFR BLD CALC: 30.1 % — LOW (ref 34.5–45)
HGB BLD-MCNC: 9.9 G/DL — LOW (ref 11.5–15.5)
IMM GRANULOCYTES NFR BLD AUTO: 0.5 % — SIGNIFICANT CHANGE UP (ref 0–1.5)
LYMPHOCYTES # BLD AUTO: 1.11 K/UL — SIGNIFICANT CHANGE UP (ref 1–3.3)
LYMPHOCYTES # BLD AUTO: 9.6 % — LOW (ref 13–44)
MAGNESIUM SERPL-MCNC: 4.9 MG/DL — HIGH (ref 1.6–2.6)
MAGNESIUM SERPL-MCNC: 5 MG/DL — HIGH (ref 1.6–2.6)
MAGNESIUM SERPL-MCNC: 5 MG/DL — HIGH (ref 1.6–2.6)
MCHC RBC-ENTMCNC: 29.6 PG — SIGNIFICANT CHANGE UP (ref 27–34)
MCHC RBC-ENTMCNC: 32.9 GM/DL — SIGNIFICANT CHANGE UP (ref 32–36)
MCV RBC AUTO: 90.1 FL — SIGNIFICANT CHANGE UP (ref 80–100)
MONOCYTES # BLD AUTO: 0.99 K/UL — HIGH (ref 0–0.9)
MONOCYTES NFR BLD AUTO: 8.6 % — SIGNIFICANT CHANGE UP (ref 2–14)
NEUTROPHILS # BLD AUTO: 9.34 K/UL — HIGH (ref 1.8–7.4)
NEUTROPHILS NFR BLD AUTO: 81.1 % — HIGH (ref 43–77)
PLATELET # BLD AUTO: 182 K/UL — SIGNIFICANT CHANGE UP (ref 150–400)
RBC # BLD: 3.34 M/UL — LOW (ref 3.8–5.2)
RBC # FLD: 14.5 % — SIGNIFICANT CHANGE UP (ref 10.3–14.5)
SARS-COV-2 IGG SERPL QL IA: POSITIVE
SARS-COV-2 IGM SERPL IA-ACNC: 1.48 INDEX — HIGH
WBC # BLD: 11.52 K/UL — HIGH (ref 3.8–10.5)
WBC # FLD AUTO: 11.52 K/UL — HIGH (ref 3.8–10.5)

## 2020-07-16 RX ORDER — IBUPROFEN 200 MG
600 TABLET ORAL EVERY 6 HOURS
Refills: 0 | Status: DISCONTINUED | OUTPATIENT
Start: 2020-07-16 | End: 2020-07-17

## 2020-07-16 RX ADMIN — Medication 975 MILLIGRAM(S): at 08:31

## 2020-07-16 RX ADMIN — Medication 50 GM/HR: at 00:47

## 2020-07-16 RX ADMIN — Medication 600 MILLIGRAM(S): at 11:33

## 2020-07-16 RX ADMIN — Medication 30 MILLIGRAM(S): at 06:07

## 2020-07-16 RX ADMIN — Medication 975 MILLIGRAM(S): at 15:12

## 2020-07-16 RX ADMIN — Medication 975 MILLIGRAM(S): at 21:45

## 2020-07-16 RX ADMIN — Medication 30 MILLIGRAM(S): at 06:10

## 2020-07-16 RX ADMIN — ENOXAPARIN SODIUM 40 MILLIGRAM(S): 100 INJECTION SUBCUTANEOUS at 11:34

## 2020-07-16 RX ADMIN — Medication 600 MILLIGRAM(S): at 18:37

## 2020-07-16 RX ADMIN — Medication 975 MILLIGRAM(S): at 10:00

## 2020-07-16 RX ADMIN — Medication 200 MILLIGRAM(S): at 19:15

## 2020-07-16 RX ADMIN — Medication 200 MILLIGRAM(S): at 08:30

## 2020-07-16 RX ADMIN — Medication 975 MILLIGRAM(S): at 21:07

## 2020-07-16 RX ADMIN — Medication 975 MILLIGRAM(S): at 16:00

## 2020-07-16 NOTE — PROGRESS NOTE ADULT - ASSESSMENT
35y year old  now POD#1 s/p primary  section at 34wks gestation 2/2 pre-eclampsia with severe features, currently on 24hr post partum MgSO4 infusion therapy.   - continue routine post partum and post op care  - continue to encourage ambulation, breast feeding, and adequate PO intake   - continue pain medication PRN   - will come off MgSO4 around 1PM, will continue q6h Mg serum levels, asymptomatic at this time, BP's WNL   - anticipated discharge tomorrow     d/w Dr. Rutledge

## 2020-07-16 NOTE — PROGRESS NOTE ADULT - SUBJECTIVE AND OBJECTIVE BOX
35y year old  now POD#1 s/p primary  section at 34wks gestation 2/2 pre-eclampsia with severe features, currently on 24hr post partum MgSO4 infusion therapy.      No acute overnight events. Pain well controlled.  Denies headache, dizziness, visual disturbances, SOB, and RUQ pain.   Patient is not ambulating yet, +[place], -flatus, -BM  Reports minimal lochia.   Denies fevers, chills, malaise, fatigue, and myalgia.     VS:   Vital Signs Last 24 Hrs  T(C): 36.6 (2020 07:00), Max: 36.9 (15 Jul 2020 22:00)  T(F): 97.8 (2020 07:00), Max: 98.5 (15 Jul 2020 22:00)  HR: 61 (2020 07:00) (61 - 85)  BP: 134/82 (2020 07:00) (108/64 - 139/81)  RR: 16 (2020 07:00) (16 - 20)  SpO2: 98% (2020 07:00) (96% - 98%)    Physical Exam:  General: NAD  Cardio: RRR  Lungs: CTAB   Abdomen: soft, ND, firm fundus palpated at the umbilicus. Dressing removed. Incision clean, dry, and intact.  Ext: nontender lower extremities, bilaterally. +2 brachioradialis DTR's bilaterally     UOP adequate overnight   Magnesium, Serum (20 @ 06:20)    Magnesium, Serum: 5.0 mg/dL    Labs:                        10.5   15.79 )-----------( 201      ( 15 Jul 2020 17:51 )             32.1

## 2020-07-17 VITALS
HEART RATE: 79 BPM | SYSTOLIC BLOOD PRESSURE: 137 MMHG | TEMPERATURE: 99 F | DIASTOLIC BLOOD PRESSURE: 79 MMHG | OXYGEN SATURATION: 98 % | RESPIRATION RATE: 18 BRPM

## 2020-07-17 RX ORDER — POLYETHYLENE GLYCOL 3350 17 G/17G
17 POWDER, FOR SOLUTION ORAL
Qty: 119 | Refills: 0
Start: 2020-07-17 | End: 2020-07-23

## 2020-07-17 RX ORDER — IBUPROFEN 200 MG
1 TABLET ORAL
Qty: 30 | Refills: 0
Start: 2020-07-17

## 2020-07-17 RX ORDER — LABETALOL HCL 100 MG
1 TABLET ORAL
Qty: 60 | Refills: 1
Start: 2020-07-17 | End: 2020-09-14

## 2020-07-17 RX ORDER — LABETALOL HCL 100 MG
1 TABLET ORAL
Qty: 0 | Refills: 0 | DISCHARGE
Start: 2020-07-17

## 2020-07-17 RX ORDER — FERROUS SULFATE 325(65) MG
1 TABLET ORAL
Qty: 60 | Refills: 0
Start: 2020-07-17 | End: 2020-08-15

## 2020-07-17 RX ADMIN — Medication 975 MILLIGRAM(S): at 12:02

## 2020-07-17 RX ADMIN — Medication 975 MILLIGRAM(S): at 11:02

## 2020-07-17 RX ADMIN — Medication 200 MILLIGRAM(S): at 06:33

## 2020-07-17 RX ADMIN — Medication 600 MILLIGRAM(S): at 00:42

## 2020-07-17 RX ADMIN — Medication 600 MILLIGRAM(S): at 06:33

## 2020-07-17 RX ADMIN — Medication 200 MILLIGRAM(S): at 15:16

## 2020-07-17 RX ADMIN — ENOXAPARIN SODIUM 40 MILLIGRAM(S): 100 INJECTION SUBCUTANEOUS at 11:03

## 2020-07-17 NOTE — PROGRESS NOTE ADULT - ATTENDING COMMENTS
Pt. was seen and examined at bedside. POD#2 s/p Primary LTC/S for Severe PEC with Uncontrolled HTN. Doing well now, s/p Magnesium sulfate prophylaxis. BP normal and occasionally low on PO Labetalol 200 mg Q8 hrs.    Progress note as above, reviewed, edited and added as needed. Agree with above.    A/P : POD#2 s/p Primary LTC/S for Severe PEC with Uncontrolled HTN, Failed IOL x 2 days and Unfavorable Cervix    BP Normal and low sometimes  Will decrease Labetalol 200 mg to PO BID  D/C home  D/C instructions given  F/U with Dr. Toribio in 3 to 4 days for incision check and BP check  PO Motrin and Tylenol for pain PRN

## 2020-07-17 NOTE — DISCHARGE NOTE OB - HOSPITAL COURSE
She is a 35y.o now a  s/p primary  section at 34w1d secondary to preeclampsia with severe features.  She had an uncomplicated surgery and postpartum course. She was discharged home in stable condition. She is a 35y.o now a  s/p primary low transverse  section at 34w1d secondary to preeclampsia with severe features.  Delivered a Live Male infant on 07/15/20. Apgar was 9/9, weight 2430 Gms. She had an uncomplicated surgery and postpartum course. Received Magnesium sulfate 24 hrs after delivery and prior. Her BP was uncontrolled prior to delivery but then was controlled on PO Labetalol 200 mg Q8 hrs after delivery. She was passing flatus, tolerating diet, pain well controlled and ambulating. Lochia mild, breast feeding. She was discharged home in stable condition on POD#2.

## 2020-07-17 NOTE — DISCHARGE NOTE OB - MEDICATION SUMMARY - MEDICATIONS TO TAKE
I will START or STAY ON the medications listed below when I get home from the hospital:    ibuprofen 600 mg oral tablet  -- 1 tab(s) by mouth every 6 hours MDD:4  -- Do not take this drug if you are pregnant.  It is very important that you take or use this exactly as directed.  Do not skip doses or discontinue unless directed by your doctor.  May cause drowsiness or dizziness.  Obtain medical advice before taking any non-prescription drugs as some may affect the action of this medication.  Take with food or milk.    -- Indication: For PAIN    oxycodone-acetaminophen 5 mg-325 mg oral tablet  -- 1 tab(s) by mouth every 8 hours MDD:3  -- Caution federal law prohibits the transfer of this drug to any person other  than the person for whom it was prescribed.  May cause drowsiness.  Alcohol may intensify this effect.  Use care when operating dangerous machinery.  This prescription cannot be refilled.  This product contains acetaminophen.  Do not use  with any other product containing acetaminophen to prevent possible liver damage.  Using more of this medication than prescribed may cause serious breathing problems.    -- Indication: For PAIN    labetalol 200 mg oral tablet  -- 1 tab(s) by mouth every 8 hours  -- Indication: For HYPERTENSION    ferrous sulfate 325 mg (65 mg elemental iron) oral delayed release tablet  -- 1 tab(s) by mouth 2 times a day   -- May discolor urine or feces.  Swallow whole.  Do not crush.    -- Indication: For ANEMIA    MiraLax oral powder for reconstitution  -- 17 gram(s) by mouth once a day   -- Dilute this medication with liquid before administration.  It is very important that you take or use this exactly as directed.  Do not skip doses or discontinue unless directed by your doctor.    -- Indication: For CONSTIPATION    cyclobenzaprine 10 mg oral tablet  -- 1 tab(s) by mouth 3 times a day  -- May cause drowsiness.  Alcohol may intensify this effect.  Use care when operating dangerous machinery.  Obtain medical advice before taking any non-prescription drugs as some may affect the action of this medication.      -- Indication: For MUSCLE RELAXANT

## 2020-07-17 NOTE — PROGRESS NOTE ADULT - SUBJECTIVE AND OBJECTIVE BOX
35y year old  now POD#2 s/p primary  section at 34wks gestation 2/2 pre-eclampsia with severe features, now on Labetalol 200 TID.      No acute overnight events. Pain well controlled.  Denies headache, dizziness, visual disturbances, SOB, and RUQ pain.   Patient is ambulating without difficulty, +voiding, -flatus, -BM  Reports minimal lochia.   Denies fevers, chills, malaise, fatigue, and myalgia.     Vital Signs Last 24 Hrs  T(C): 37.3 (2020 05:56), Max: 37.3 (2020 05:56)  T(F): 99.2 (2020 05:56), Max: 99.2 (2020 05:56)  HR: 80 (2020 05:56) (63 - 96)  BP: 133/70 (2020 05:56) (96/45 - 135/84)  RR: 16 (2020 05:48) (16 - 18)  SpO2: 100% (2020 05:48) (98% - 100%)    Physical Exam:  General: NAD  Abdomen: soft, ND, firm fundus palpated at the umbilicus. Incision clean, dry, and intact.  Ext: nontender lower extremities, bilaterally. +2 brachioradialis DTR's bilaterally     Labs:                                   9.9    11.52 )-----------( 182      ( 2020 06:20 )             30.1 35y year old now  and POD#2 s/p primary  section at 34+1/7 wks gestation 2/2 pre-eclampsia with severe features, now on Labetalol 200 TID.      No acute overnight events. Pain well controlled.  Denies headache, dizziness, visual disturbances, SOB, and RUQ pain.   Patient is ambulating without difficulty, +voiding, -flatus, -BM  Reports minimal lochia.   Denies fevers, chills, malaise, fatigue, and myalgia.     Vital Signs Last 24 Hrs  T(C): 37.3 (2020 05:56), Max: 37.3 (2020 05:56)  T(F): 99.2 (2020 05:56), Max: 99.2 (2020 05:56)  HR: 80 (2020 05:56) (63 - 96)  BP: 133/70 (2020 05:56) (96/45 - 135/84)  RR: 16 (2020 05:48) (16 - 18)  SpO2: 100% (2020 05:48) (98% - 100%)    Physical Exam:  General: NAD  CVS: Positive S1S2, RRR  Lungs: CTA B/L , No W/R/R  Abdomen: soft, ND, firm fundus palpated at the umbilicus. Incision clean, dry, and intact. Steri strips in place.  Ext: nontender lower extremities, bilaterally. +2 brachioradialis DTR's bilaterally     Labs:                                   9.9    11.52 )-----------( 182      ( 2020 06:20 )             30.1

## 2020-07-17 NOTE — DISCHARGE NOTE OB - PLAN OF CARE
Rapid recovery Patient should transition to regular activity level. Resume regular diet. Patient should follow up with her OB for a postpartum checkup 1 weeks after delivery for blood pressure check. Patient should call her doctor sooner if she develops a fever or uncontrolled vaginal bleeding or fevers. Please call sooner if there are any other concerns.

## 2020-07-17 NOTE — DISCHARGE NOTE OB - PATIENT PORTAL LINK FT
You can access the FollowMyHealth Patient Portal offered by [facility] by registering at the following website: [email or web address]. By joining [facility]’s FollowMyHealth portal, you will also be able to view your health information using other applications (apps) compatible with our system.

## 2020-07-17 NOTE — PROGRESS NOTE ADULT - ASSESSMENT
35y year old  now POD#2 s/p primary  section at 34wks gestation 2/2 pre-eclampsia with severe features, currently Labetalol 200 TID.   - meeting all post partum and post operative milestones  - BP's WNL with PO meds, will be discharged with same and follow up in clinic   - desires discharge today     d/w Dr. Mcconnell 35y year old  now POD#2 s/p primary  section at 34+1/7 wks gestation 2/2 pre-eclampsia with severe features, currently Labetalol 200 TID.   - meeting all post partum and post operative milestones  - BP's WNL with PO meds, will be discharged with same and follow up in clinic   - desires discharge today     d/w Dr. Mcconnell

## 2020-07-17 NOTE — DISCHARGE NOTE OB - CARE PLAN
Principal Discharge DX:	 delivery delivered  Goal:	Rapid recovery  Assessment and plan of treatment:	Patient should transition to regular activity level. Resume regular diet. Patient should follow up with her OB for a postpartum checkup 1 weeks after delivery for blood pressure check. Patient should call her doctor sooner if she develops a fever or uncontrolled vaginal bleeding or fevers. Please call sooner if there are any other concerns.

## 2020-07-17 NOTE — DISCHARGE NOTE OB - CARE PROVIDER_API CALL
Yanira Toribio  OBSTETRICS AND GYNECOLOGY  [address] [place] [address]  Phone: [phone]  Fax: [phone]  Follow Up Time:

## 2020-07-22 DIAGNOSIS — Z86.19 PERSONAL HISTORY OF OTHER INFECTIOUS AND PARASITIC DISEASES: ICD-10-CM

## 2020-07-22 DIAGNOSIS — Z83.3 FAMILY HISTORY OF DIABETES MELLITUS: ICD-10-CM

## 2020-07-22 DIAGNOSIS — O34.83 MATERNAL CARE FOR OTHER ABNORMALITIES OF PELVIC ORGANS, THIRD TRIMESTER: ICD-10-CM

## 2020-07-22 DIAGNOSIS — Z3A.34 34 WEEKS GESTATION OF PREGNANCY: ICD-10-CM

## 2020-07-22 DIAGNOSIS — N83.8 OTHER NONINFLAMMATORY DISORDERS OF OVARY, FALLOPIAN TUBE AND BROAD LIGAMENT: ICD-10-CM

## 2020-07-22 DIAGNOSIS — O61.0 FAILED MEDICAL INDUCTION OF LABOR: ICD-10-CM

## 2020-07-28 ENCOUNTER — APPOINTMENT (OUTPATIENT)
Dept: ANTEPARTUM | Facility: CLINIC | Age: 35
End: 2020-07-28

## 2020-08-04 ENCOUNTER — APPOINTMENT (OUTPATIENT)
Dept: ANTEPARTUM | Facility: CLINIC | Age: 35
End: 2020-08-04

## 2020-08-11 ENCOUNTER — APPOINTMENT (OUTPATIENT)
Dept: ANTEPARTUM | Facility: CLINIC | Age: 35
End: 2020-08-11

## 2020-08-18 ENCOUNTER — APPOINTMENT (OUTPATIENT)
Dept: ANTEPARTUM | Facility: CLINIC | Age: 35
End: 2020-08-18

## 2020-08-25 ENCOUNTER — APPOINTMENT (OUTPATIENT)
Dept: ANTEPARTUM | Facility: CLINIC | Age: 35
End: 2020-08-25

## 2021-04-16 ENCOUNTER — OUTPATIENT (OUTPATIENT)
Dept: OUTPATIENT SERVICES | Facility: HOSPITAL | Age: 36
LOS: 1 days | End: 2021-04-16
Payer: MEDICAID

## 2021-04-16 ENCOUNTER — RESULT REVIEW (OUTPATIENT)
Age: 36
End: 2021-04-16

## 2021-04-16 ENCOUNTER — APPOINTMENT (OUTPATIENT)
Dept: ULTRASOUND IMAGING | Facility: CLINIC | Age: 36
End: 2021-04-16
Payer: MEDICAID

## 2021-04-16 DIAGNOSIS — Z12.39 ENCOUNTER FOR OTHER SCREENING FOR MALIGNANT NEOPLASM OF BREAST: ICD-10-CM

## 2021-04-16 PROCEDURE — 76641 ULTRASOUND BREAST COMPLETE: CPT | Mod: 26,50

## 2021-04-16 PROCEDURE — 76641 ULTRASOUND BREAST COMPLETE: CPT

## 2021-07-07 ENCOUNTER — APPOINTMENT (OUTPATIENT)
Dept: ANTEPARTUM | Facility: CLINIC | Age: 36
End: 2021-07-07
Payer: MEDICAID

## 2021-07-07 ENCOUNTER — ASOB RESULT (OUTPATIENT)
Age: 36
End: 2021-07-07

## 2021-07-07 PROCEDURE — 76801 OB US < 14 WKS SINGLE FETUS: CPT

## 2021-07-07 PROCEDURE — 76813 OB US NUCHAL MEAS 1 GEST: CPT | Mod: 59

## 2021-07-22 ENCOUNTER — APPOINTMENT (OUTPATIENT)
Dept: MATERNAL FETAL MEDICINE | Facility: CLINIC | Age: 36
End: 2021-07-22
Payer: MEDICAID

## 2021-07-22 ENCOUNTER — ASOB RESULT (OUTPATIENT)
Age: 36
End: 2021-07-22

## 2021-07-22 PROCEDURE — 99442: CPT

## 2021-07-28 ENCOUNTER — APPOINTMENT (OUTPATIENT)
Dept: MATERNAL FETAL MEDICINE | Facility: CLINIC | Age: 36
End: 2021-07-28

## 2021-07-29 ENCOUNTER — APPOINTMENT (OUTPATIENT)
Dept: ANTEPARTUM | Facility: CLINIC | Age: 36
End: 2021-07-29

## 2021-07-29 ENCOUNTER — APPOINTMENT (OUTPATIENT)
Dept: MATERNAL FETAL MEDICINE | Facility: CLINIC | Age: 36
End: 2021-07-29

## 2021-08-16 ENCOUNTER — APPOINTMENT (OUTPATIENT)
Dept: MATERNAL FETAL MEDICINE | Facility: CLINIC | Age: 36
End: 2021-08-16
Payer: MEDICAID

## 2021-08-16 ENCOUNTER — APPOINTMENT (OUTPATIENT)
Dept: ANTEPARTUM | Facility: CLINIC | Age: 36
End: 2021-08-16
Payer: MEDICAID

## 2021-08-16 ENCOUNTER — ASOB RESULT (OUTPATIENT)
Age: 36
End: 2021-08-16

## 2021-08-16 VITALS
DIASTOLIC BLOOD PRESSURE: 68 MMHG | HEART RATE: 72 BPM | BODY MASS INDEX: 35.17 KG/M2 | SYSTOLIC BLOOD PRESSURE: 100 MMHG | WEIGHT: 206 LBS | HEIGHT: 64 IN

## 2021-08-16 DIAGNOSIS — Z87.59 PERSONAL HISTORY OF OTHER COMPLICATIONS OF PREGNANCY, CHILDBIRTH AND THE PUERPERIUM: ICD-10-CM

## 2021-08-16 PROCEDURE — ZZZZZ: CPT

## 2021-08-16 PROCEDURE — 99204 OFFICE O/P NEW MOD 45 MIN: CPT

## 2021-08-16 PROCEDURE — 76816 OB US FOLLOW-UP PER FETUS: CPT

## 2021-08-16 PROCEDURE — 36415 COLL VENOUS BLD VENIPUNCTURE: CPT

## 2021-08-16 NOTE — HISTORY OF PRESENT ILLNESS
[FreeTextEntry1] : Anastasia presents for interval grown sonogram and discussion of the chronic hypertension and previous preeclampsia.

## 2021-08-16 NOTE — DATA REVIEWED
[FreeTextEntry1] : Sonogram today shows appropriate fetal growth\par \par She currently is being treated for chronic hypertension with labetolol 100mg BID with good results. BP today was 100/68, and she denies any regular headaches or visual changes.  Her first 4 pregnancies were uneventful, however her last pregnancy (with a different partner) had development of severe preeclampsia requiring delivery at 34 weeks by  after 4 prior vaginal deliveries. \par \par She is currently pregnant with the same partner as pregnancy 5, and is on baby aspirin PIH prophylaxis in addition to the labetolol\par \par We discussed the risk of preeclampsia recurrence and the benefits of the baby aspirin.  We also discussed the need for monitoring of fetal growth due to the chronic hypertension. Anastasia understands all the concerns and risks and the need for monitoring.

## 2021-08-16 NOTE — DISCUSSION/SUMMARY
[FreeTextEntry1] : Level 2 sonogram in 2 weeks. \par \par Growth scan every 4-6 weeks. \par \par Continue Labetolol as ordered. \par \par Monitor for elevated BP and adjust labetolol as needed. Baseline preeclampsia labs to monitor for evidence of superimposed ppreeclampsia. \par \par Weekly fetal testing to begin at 32-34 weeks.

## 2021-08-26 ENCOUNTER — NON-APPOINTMENT (OUTPATIENT)
Age: 36
End: 2021-08-26

## 2021-09-01 ENCOUNTER — APPOINTMENT (OUTPATIENT)
Dept: ANTEPARTUM | Facility: CLINIC | Age: 36
End: 2021-09-01
Payer: MEDICAID

## 2021-09-01 ENCOUNTER — ASOB RESULT (OUTPATIENT)
Age: 36
End: 2021-09-01

## 2021-09-01 PROCEDURE — 76817 TRANSVAGINAL US OBSTETRIC: CPT

## 2021-09-01 PROCEDURE — 76811 OB US DETAILED SNGL FETUS: CPT

## 2021-09-15 ENCOUNTER — ASOB RESULT (OUTPATIENT)
Age: 36
End: 2021-09-15

## 2021-09-15 ENCOUNTER — APPOINTMENT (OUTPATIENT)
Dept: ANTEPARTUM | Facility: CLINIC | Age: 36
End: 2021-09-15
Payer: MEDICAID

## 2021-09-15 PROCEDURE — 76816 OB US FOLLOW-UP PER FETUS: CPT

## 2021-09-29 ENCOUNTER — ASOB RESULT (OUTPATIENT)
Age: 36
End: 2021-09-29

## 2021-09-29 ENCOUNTER — APPOINTMENT (OUTPATIENT)
Dept: ANTEPARTUM | Facility: CLINIC | Age: 36
End: 2021-09-29
Payer: MEDICAID

## 2021-09-29 PROCEDURE — 76816 OB US FOLLOW-UP PER FETUS: CPT

## 2021-09-29 PROCEDURE — 76820 UMBILICAL ARTERY ECHO: CPT

## 2021-10-21 ENCOUNTER — EMERGENCY (EMERGENCY)
Facility: HOSPITAL | Age: 36
LOS: 0 days | Discharge: HOME | End: 2021-10-21
Attending: EMERGENCY MEDICINE
Payer: MEDICAID

## 2021-10-21 VITALS
SYSTOLIC BLOOD PRESSURE: 114 MMHG | RESPIRATION RATE: 18 BRPM | WEIGHT: 203.05 LBS | TEMPERATURE: 98 F | HEIGHT: 64 IN | DIASTOLIC BLOOD PRESSURE: 78 MMHG | OXYGEN SATURATION: 98 % | HEART RATE: 95 BPM

## 2021-10-21 DIAGNOSIS — R51.9 HEADACHE, UNSPECIFIED: ICD-10-CM

## 2021-10-21 DIAGNOSIS — O99.891 OTHER SPECIFIED DISEASES AND CONDITIONS COMPLICATING PREGNANCY: ICD-10-CM

## 2021-10-21 DIAGNOSIS — O98.519 OTHER VIRAL DISEASES COMPLICATING PREGNANCY, UNSPECIFIED TRIMESTER: ICD-10-CM

## 2021-10-21 DIAGNOSIS — R05.9 COUGH, UNSPECIFIED: ICD-10-CM

## 2021-10-21 DIAGNOSIS — Z3A.28 28 WEEKS GESTATION OF PREGNANCY: ICD-10-CM

## 2021-10-21 DIAGNOSIS — B34.9 VIRAL INFECTION, UNSPECIFIED: ICD-10-CM

## 2021-10-21 LAB
HMPV RNA SPEC QL NAA+PROBE: DETECTED
RAPID RVP RESULT: DETECTED
SARS-COV-2 RNA SPEC QL NAA+PROBE: SIGNIFICANT CHANGE UP

## 2021-10-21 PROCEDURE — 99284 EMERGENCY DEPT VISIT MOD MDM: CPT | Mod: 25

## 2021-10-21 PROCEDURE — 0225U NFCT DS DNA&RNA 21 SARSCOV2: CPT

## 2021-10-21 PROCEDURE — 99284 EMERGENCY DEPT VISIT MOD MDM: CPT

## 2021-10-21 PROCEDURE — 96374 THER/PROPH/DIAG INJ IV PUSH: CPT

## 2021-10-21 RX ORDER — METOCLOPRAMIDE HCL 10 MG
10 TABLET ORAL ONCE
Refills: 0 | Status: COMPLETED | OUTPATIENT
Start: 2021-10-21 | End: 2021-10-21

## 2021-10-21 RX ORDER — ACETAMINOPHEN 500 MG
1000 TABLET ORAL ONCE
Refills: 0 | Status: COMPLETED | OUTPATIENT
Start: 2021-10-21 | End: 2021-10-21

## 2021-10-21 RX ORDER — SODIUM CHLORIDE 9 MG/ML
1000 INJECTION INTRAMUSCULAR; INTRAVENOUS; SUBCUTANEOUS ONCE
Refills: 0 | Status: COMPLETED | OUTPATIENT
Start: 2021-10-21 | End: 2021-10-21

## 2021-10-21 RX ADMIN — Medication 1000 MILLIGRAM(S): at 17:28

## 2021-10-21 RX ADMIN — SODIUM CHLORIDE 1000 MILLILITER(S): 9 INJECTION INTRAMUSCULAR; INTRAVENOUS; SUBCUTANEOUS at 17:29

## 2021-10-21 RX ADMIN — Medication 10 MILLIGRAM(S): at 17:28

## 2021-10-21 NOTE — ED STATDOCS - OBJECTIVE STATEMENT
35 y/o female who is 28 weeks gestation presents to the Ed c/o of cough, headache, body aches x few days. Today pt reports headache and sinus pain has been worsening. Pt denies fevers. Covid vaccinated, with SOB by pt reports it has been the same for all her pregnancies. No know COVID sick contacts.    ID # [id number]

## 2021-10-21 NOTE — ED STATDOCS - PROGRESS NOTE DETAILS
Spoke with L&D. States due to her symptoms, would not want her to come to the floor incase she has covid. Will come down to do NST. -Jamal Jules PA-C Using  35 yo female , currently 28 weeks pregnant presents with headache, facial/sinus pain x few days. Associated loss of taste and smell. Pt has been vaccinated against covid. Will check covid, ivf and pain meds and cosult L&D. [facility] -Jamal Jules PA-C L&D came down to do NST which was normal. Pending meds to reeval pt's ROJO. -Jamal Jules PA-C Using  [id number], pt feeling better. NST unremarkable. Discussed possibility of a viral infection that pt should take tylenol, rest, and kep hydrated. Pt aware, covid./rvp pending. Strict return precautions given. Will d/c home. -Jaaml Jules PA-C

## 2021-10-21 NOTE — ED STATDOCS - GASTROINTESTINAL, MLM
abdomen soft, non-tender, and non-distended. Bowel sounds present. +Gravid uterus midway between xyphoid and umbilicus.

## 2021-10-21 NOTE — ED STATDOCS - CLINICAL SUMMARY MEDICAL DECISION MAKING FREE TEXT BOX
Pt with headache and viral syndrome, likely viral sinusitis, will check RVP, will treat headache and re-assess. Also 28 weeks gestation, will consult for OB/GYN for NST.

## 2021-10-21 NOTE — ED ADULT TRIAGE NOTE - CHIEF COMPLAINT QUOTE
patient 28wks pregnant complaining of HA with facial pain x3days and decreased taste and smell x1wk. denies vision changes. as per Geovani L&D resident, patient to be seen in ED and then up to L&D for monitoring

## 2021-10-21 NOTE — ED ADULT NURSE NOTE - NSIMPLEMENTINTERV_GEN_ALL_ED
Implemented All Universal Safety Interventions:  [place] to call system. Call bell, personal items and telephone within reach. Instruct patient to call for assistance. Room bathroom lighting operational. Non-slip footwear when patient is off stretcher. Physically safe environment: no spills, clutter or unnecessary equipment. Stretcher in lowest position, wheels locked, appropriate side rails in place.

## 2021-10-21 NOTE — ED STATDOCS - PATIENT PORTAL LINK FT
You can access the FollowMyHealth Patient Portal offered by [facility] by registering at the following website: [email or web address]. By joining [facility]’s FollowMyHealth portal, you will also be able to view your health information using other applications (apps) compatible with our system.

## 2021-10-21 NOTE — ED STATDOCS - ENMT, MLM
Nasal mucosa clear. +Maxillary sinus tenderness.  Mouth with normal mucosa  Throat has no vesicles, no oropharyngeal exudates and uvula is midline.

## 2021-10-21 NOTE — ED STATDOCS - NSFOLLOWUPINSTRUCTIONS_ED_ALL_ED_FT
Infección respiratoria superior    LO QUE NECESITA SABER:    Jean-Claude infección de las vías respiratorias superiores también se conoce last resfriado. Puede afectar strickland nariz, garganta, oídos y los senos paranasales. Los síntomas del resfriado generalmente son más graves sujatha los primeros 3 a 5 días. La mayoría de las personas mejoran en 7 a 14 días. Puede que usted siga tosiendo por 2 a 3 semanas. Los resfriados son provocados por virus y no mejoran con antibióticos.    INSTRUCCIONES SOBRE EL SIMON HOSPITALARIA:    Llame al número de emergencias local (911 en los [place]) si:  •Usted tiene dolor torácico y dificultad para respirar.          Regrese a la rené de emergencias si:  •Usted tiene fiebre más simon de 102 ºF (39 ºC).          Llame a strickland médico si:  •Tiene fiebre baja.      •Strickland garganta irritada empeora o usted ve manchas marc o gillian en strickland garganta.      •Bertha síntomas empeoran después de 3 a 5 días o no mejora en 14 días.      •Usted tiene sarpullido en alguna parte de strickland piel.      •Usted tiene bultos grandes y sensibles en strickland luba      •Usted tiene secreción espesa de color angie o amarillo proveniente de strickland nariz.      •Usted expectora mucosidad de color amarillo, angie o con kuldeep.      •Usted tiene un grave dolor de oído.      •Usted tiene preguntas o inquietudes acerca de strickland condición o cuidado.      Medicamentos:Es posible que usted necesite alguno de los siguientes:     •Los jarabes para la tosayudan a reducir la tos. Pregúntele a strickland médico cuál tipo de medicamento para la tos es mejor para usted.      •Acetaminofénalivia el dolor y baja la fiebre. Está disponible sin receta médica. Pregunte la cantidad y la frecuencia con que debe tomarlos. Siga las indicaciones. Colleen las etiquetas de todos los demás medicamentos que esté usando para saber si también contienen acetaminofén, o pregunte a strickland médico o farmacéutico. El acetaminofén puede causar daño en el hígado cuando no se dread de forma correcta. No use más de 4 gramos (4000 miligramos) en total de acetaminofeno en un día.      •[place] bertha medicamentos last se le haya indicado.Consulte con strickland médico si usted ryan que strickland medicamento no le está ayudando o si presenta efectos secundarios. Infórmele si es alérgico a cualquier medicamento. Mantenga jean-claude lista actualizada de los medicamentos, las vitaminas y los productos herbales que dread. Incluya los siguientes datos de los medicamentos: cantidad, frecuencia y motivo de administración. Traiga con usted la lista o los envases de las píldoras a bertha citas de seguimiento. Lleve la lista de los medicamentos con usted en [place]verley de jean-claude emergencia.      Cuidados personales:  •Descanse el mayor tiempo posible.Empiece a hacer un poco más día a día.      •[place] más líquidos last se le indique.Los líquidos le ayudarán a aflojar y disolver la mucosidad para que la pueda expectorar. Los líquidos ayudarán a evitar la deshidratación. Los líquidos que ayudan a prevenir la deshidratación pueden ser agua, jugo de fruta y caldo. No tome líquidos que contienen cafeína. La cafeína puede aumentar el riesgo de deshidratación. Pregúntele a strickland médico cuál es la cantidad de líquido que necesita ingerir a diario.      •Alivie el dolor de garganta.Aliyah gárgaras de agua tibia con sal. Prepare agua salina disolviendo ¼ de cucharada de sal a 1 taza de agua tibia. Usted puede también chupar dulces duros o pastillas para la garganta. Usted puede usar aerosol para el dolor de garganta.      •Use un humidificador o vaporizador.Use un humidificador de chelsea frío o un vaporizador para elevar la humedad en strickland casa. [place] podría ayudarle a respirar más fácilmente y al mismo tiempo disminuir la tos.      •Use gotas nasales de solución salina last se le haya indicado.Estas ayudan a aliviar la congestión.      •Aplique vaselina en la parte externa alrededor de las fosas nasales.Esta puede disminuir la irritación de sonarse la nariz.      •No fume.La nicotina y otros químicos en los cigarrillos y cigarros pueden empeorar bertha síntomas. También pueden causar infecciones last la bronquitis o la neumonía. Pida información a strickland médico si usted actualmente fuma y necesita ayuda para dejar de fumar. Los cigarrillos electrónicos o el tabaco sin humo igualmente contienen nicotina. Consulte con strickland médico antes de utilizar estos productos.      Evite un resfriado:  •Lávese las rere frecuentemente.Use agua y jabón cada vez que se lave las rere. Frótese las rere enjabonadas, entrelazando los dedos. Use los dedos de jean-claude mano para restregar debajo de las uñas de la otra mano. Lávese sujatha al menos 20 segundos. Enjuague con agua corriente caliente sujatha varios segundos. Luego séquese las rere. Use gel antibacterial si no tiene agua y jabón a strickland disponibilidad. No se toque los ojos o la boca sin antes lavarse las rere.   Lavado de rere           •Cúbrase al toser o estornudar.Use un pañuelo que cubra la boca y la nariz. Deseche el pañuelo usado de inmediato en la basura. Use el ángulo del brazo si no tiene un pañuelo disponible. Lávese las rere con agua y jabón o use un desinfectante de rere. No se pare cerca de nadie que esté estornudando o tosiendo.      •Trate de mantenerse alejado de los demás mientras esté enfermo.[place] es especialmente importante sujatha los primeros 2 o 3 días, cuando el virus se propaga más fácilmente. Espere hasta que la fiebre, la tos u otros síntomas desaparezcan antes de volver al trabajo u otras actividades regulares.      •No comparta objetos mientras esté enfermo.[place] incluye alimentos, bebidas, utensilios para comer y platos.      Acuda a la consulta de control con strickland médico según las indicaciones:Anote bertha preguntas para que se acuerde de hacerlas sujatha bertha visitas.

## 2021-10-27 ENCOUNTER — ASOB RESULT (OUTPATIENT)
Age: 36
End: 2021-10-27

## 2021-10-27 ENCOUNTER — APPOINTMENT (OUTPATIENT)
Dept: ANTEPARTUM | Facility: CLINIC | Age: 36
End: 2021-10-27
Payer: MEDICAID

## 2021-10-27 PROCEDURE — 76820 UMBILICAL ARTERY ECHO: CPT

## 2021-10-27 PROCEDURE — 76816 OB US FOLLOW-UP PER FETUS: CPT

## 2021-12-03 ENCOUNTER — ASOB RESULT (OUTPATIENT)
Age: 36
End: 2021-12-03

## 2021-12-03 ENCOUNTER — APPOINTMENT (OUTPATIENT)
Dept: ANTEPARTUM | Facility: CLINIC | Age: 36
End: 2021-12-03
Payer: MEDICAID

## 2021-12-03 PROCEDURE — 76820 UMBILICAL ARTERY ECHO: CPT

## 2021-12-03 PROCEDURE — 76816 OB US FOLLOW-UP PER FETUS: CPT

## 2021-12-10 ENCOUNTER — APPOINTMENT (OUTPATIENT)
Dept: ANTEPARTUM | Facility: CLINIC | Age: 36
End: 2021-12-10
Payer: MEDICAID

## 2021-12-10 ENCOUNTER — ASOB RESULT (OUTPATIENT)
Age: 36
End: 2021-12-10

## 2021-12-10 PROCEDURE — 76818 FETAL BIOPHYS PROFILE W/NST: CPT

## 2021-12-16 ENCOUNTER — APPOINTMENT (OUTPATIENT)
Dept: ANTEPARTUM | Facility: CLINIC | Age: 36
End: 2021-12-16
Payer: MEDICAID

## 2021-12-16 ENCOUNTER — ASOB RESULT (OUTPATIENT)
Age: 36
End: 2021-12-16

## 2021-12-16 PROCEDURE — 76818 FETAL BIOPHYS PROFILE W/NST: CPT

## 2021-12-16 PROCEDURE — 76820 UMBILICAL ARTERY ECHO: CPT

## 2021-12-22 ENCOUNTER — INPATIENT (INPATIENT)
Facility: HOSPITAL | Age: 36
LOS: 2 days | Discharge: HOME | DRG: 540 | End: 2021-12-25
Attending: OBSTETRICS & GYNECOLOGY | Admitting: OBSTETRICS & GYNECOLOGY
Payer: MEDICAID

## 2021-12-22 ENCOUNTER — RESULT REVIEW (OUTPATIENT)
Age: 36
End: 2021-12-22

## 2021-12-22 VITALS
OXYGEN SATURATION: 98 % | SYSTOLIC BLOOD PRESSURE: 131 MMHG | HEART RATE: 79 BPM | TEMPERATURE: 98 F | DIASTOLIC BLOOD PRESSURE: 74 MMHG | RESPIRATION RATE: 18 BRPM

## 2021-12-22 DIAGNOSIS — O26.899 OTHER SPECIFIED PREGNANCY RELATED CONDITIONS, UNSPECIFIED TRIMESTER: ICD-10-CM

## 2021-12-22 LAB
ALBUMIN SERPL ELPH-MCNC: 2.2 G/DL — LOW (ref 3.3–5)
ALP SERPL-CCNC: 144 U/L — HIGH (ref 40–120)
ALT FLD-CCNC: 17 U/L — SIGNIFICANT CHANGE UP (ref 12–78)
ANION GAP SERPL CALC-SCNC: 9 MMOL/L — SIGNIFICANT CHANGE UP (ref 5–17)
APPEARANCE UR: CLEAR — SIGNIFICANT CHANGE UP
APTT BLD: 28.5 SEC — SIGNIFICANT CHANGE UP (ref 27.5–35.5)
AST SERPL-CCNC: 20 U/L — SIGNIFICANT CHANGE UP (ref 15–37)
BASOPHILS # BLD AUTO: 0.01 K/UL — SIGNIFICANT CHANGE UP (ref 0–0.2)
BASOPHILS NFR BLD AUTO: 0.1 % — SIGNIFICANT CHANGE UP (ref 0–2)
BILIRUB SERPL-MCNC: 0.2 MG/DL — SIGNIFICANT CHANGE UP (ref 0.2–1.2)
BILIRUB UR-MCNC: NEGATIVE — SIGNIFICANT CHANGE UP
BUN SERPL-MCNC: 9 MG/DL — SIGNIFICANT CHANGE UP (ref 7–23)
CALCIUM SERPL-MCNC: 7.9 MG/DL — LOW (ref 8.5–10.1)
CHLORIDE SERPL-SCNC: 110 MMOL/L — HIGH (ref 96–108)
CO2 SERPL-SCNC: 21 MMOL/L — LOW (ref 22–31)
COLOR SPEC: YELLOW — SIGNIFICANT CHANGE UP
COVID-19 SPIKE DOMAIN AB INTERP: POSITIVE
COVID-19 SPIKE DOMAIN ANTIBODY RESULT: >250 U/ML — HIGH
CREAT ?TM UR-MCNC: 303 MG/DL — SIGNIFICANT CHANGE UP
CREAT SERPL-MCNC: 0.5 MG/DL — SIGNIFICANT CHANGE UP (ref 0.5–1.3)
DIFF PNL FLD: ABNORMAL
EOSINOPHIL # BLD AUTO: 0.01 K/UL — SIGNIFICANT CHANGE UP (ref 0–0.5)
EOSINOPHIL NFR BLD AUTO: 0.1 % — SIGNIFICANT CHANGE UP (ref 0–6)
FIBRINOGEN PPP-MCNC: 658 MG/DL — HIGH (ref 290–520)
GLUCOSE SERPL-MCNC: 69 MG/DL — LOW (ref 70–99)
GLUCOSE UR QL: NEGATIVE MG/DL — SIGNIFICANT CHANGE UP
HCT VFR BLD CALC: 33.7 % — LOW (ref 34.5–45)
HGB BLD-MCNC: 11 G/DL — LOW (ref 11.5–15.5)
IMM GRANULOCYTES NFR BLD AUTO: 0.6 % — SIGNIFICANT CHANGE UP (ref 0–1.5)
INR BLD: 1.01 RATIO — SIGNIFICANT CHANGE UP (ref 0.88–1.16)
KETONES UR-MCNC: ABNORMAL
LDH SERPL L TO P-CCNC: 188 U/L — SIGNIFICANT CHANGE UP (ref 84–241)
LEUKOCYTE ESTERASE UR-ACNC: ABNORMAL
LYMPHOCYTES # BLD AUTO: 1.32 K/UL — SIGNIFICANT CHANGE UP (ref 1–3.3)
LYMPHOCYTES # BLD AUTO: 19.3 % — SIGNIFICANT CHANGE UP (ref 13–44)
MAGNESIUM SERPL-MCNC: 4.2 MG/DL — HIGH (ref 1.6–2.6)
MCHC RBC-ENTMCNC: 27.7 PG — SIGNIFICANT CHANGE UP (ref 27–34)
MCHC RBC-ENTMCNC: 32.6 GM/DL — SIGNIFICANT CHANGE UP (ref 32–36)
MCV RBC AUTO: 84.9 FL — SIGNIFICANT CHANGE UP (ref 80–100)
MONOCYTES # BLD AUTO: 0.59 K/UL — SIGNIFICANT CHANGE UP (ref 0–0.9)
MONOCYTES NFR BLD AUTO: 8.6 % — SIGNIFICANT CHANGE UP (ref 2–14)
NEUTROPHILS # BLD AUTO: 4.88 K/UL — SIGNIFICANT CHANGE UP (ref 1.8–7.4)
NEUTROPHILS NFR BLD AUTO: 71.3 % — SIGNIFICANT CHANGE UP (ref 43–77)
NITRITE UR-MCNC: POSITIVE
PH UR: 6 — SIGNIFICANT CHANGE UP (ref 5–8)
PLATELET # BLD AUTO: 187 K/UL — SIGNIFICANT CHANGE UP (ref 150–400)
POTASSIUM SERPL-MCNC: 4 MMOL/L — SIGNIFICANT CHANGE UP (ref 3.5–5.3)
POTASSIUM SERPL-SCNC: 4 MMOL/L — SIGNIFICANT CHANGE UP (ref 3.5–5.3)
PROT ?TM UR-MCNC: 2084 MG/DL — HIGH (ref 0–12)
PROT SERPL-MCNC: 6.3 GM/DL — SIGNIFICANT CHANGE UP (ref 6–8.3)
PROT UR-MCNC: 500 MG/DL
PROT/CREAT UR-RTO: 6.9 RATIO — HIGH (ref 0–0.2)
PROTHROM AB SERPL-ACNC: 11.7 SEC — SIGNIFICANT CHANGE UP (ref 10.6–13.6)
RBC # BLD: 3.97 M/UL — SIGNIFICANT CHANGE UP (ref 3.8–5.2)
RBC # FLD: 14.6 % — HIGH (ref 10.3–14.5)
SARS-COV-2 IGG+IGM SERPL QL IA: >250 U/ML — HIGH
SARS-COV-2 IGG+IGM SERPL QL IA: POSITIVE
SARS-COV-2 RNA SPEC QL NAA+PROBE: SIGNIFICANT CHANGE UP
SODIUM SERPL-SCNC: 140 MMOL/L — SIGNIFICANT CHANGE UP (ref 135–145)
SP GR SPEC: 1.02 — SIGNIFICANT CHANGE UP (ref 1.01–1.02)
T PALLIDUM AB TITR SER: NEGATIVE — SIGNIFICANT CHANGE UP
URATE SERPL-MCNC: 5.3 MG/DL — SIGNIFICANT CHANGE UP (ref 2.5–7)
UROBILINOGEN FLD QL: NEGATIVE MG/DL — SIGNIFICANT CHANGE UP
WBC # BLD: 6.85 K/UL — SIGNIFICANT CHANGE UP (ref 3.8–10.5)
WBC # FLD AUTO: 6.85 K/UL — SIGNIFICANT CHANGE UP (ref 3.8–10.5)

## 2021-12-22 PROCEDURE — 36415 COLL VENOUS BLD VENIPUNCTURE: CPT

## 2021-12-22 PROCEDURE — 59050 FETAL MONITOR W/REPORT: CPT

## 2021-12-22 PROCEDURE — 87635 SARS-COV-2 COVID-19 AMP PRB: CPT

## 2021-12-22 PROCEDURE — 86769 SARS-COV-2 COVID-19 ANTIBODY: CPT

## 2021-12-22 PROCEDURE — 88307 TISSUE EXAM BY PATHOLOGIST: CPT | Mod: 26

## 2021-12-22 PROCEDURE — 83615 LACTATE (LD) (LDH) ENZYME: CPT

## 2021-12-22 PROCEDURE — 85730 THROMBOPLASTIN TIME PARTIAL: CPT

## 2021-12-22 PROCEDURE — 82570 ASSAY OF URINE CREATININE: CPT

## 2021-12-22 PROCEDURE — 86850 RBC ANTIBODY SCREEN: CPT

## 2021-12-22 PROCEDURE — 85384 FIBRINOGEN ACTIVITY: CPT

## 2021-12-22 PROCEDURE — 81001 URINALYSIS AUTO W/SCOPE: CPT

## 2021-12-22 PROCEDURE — 86923 COMPATIBILITY TEST ELECTRIC: CPT

## 2021-12-22 PROCEDURE — 85025 COMPLETE CBC W/AUTO DIFF WBC: CPT

## 2021-12-22 PROCEDURE — G0463: CPT

## 2021-12-22 PROCEDURE — 83735 ASSAY OF MAGNESIUM: CPT

## 2021-12-22 PROCEDURE — 84550 ASSAY OF BLOOD/URIC ACID: CPT

## 2021-12-22 PROCEDURE — 86780 TREPONEMA PALLIDUM: CPT

## 2021-12-22 PROCEDURE — 85610 PROTHROMBIN TIME: CPT

## 2021-12-22 PROCEDURE — 88307 TISSUE EXAM BY PATHOLOGIST: CPT

## 2021-12-22 PROCEDURE — 86900 BLOOD TYPING SEROLOGIC ABO: CPT

## 2021-12-22 PROCEDURE — 80053 COMPREHEN METABOLIC PANEL: CPT

## 2021-12-22 PROCEDURE — 84156 ASSAY OF PROTEIN URINE: CPT

## 2021-12-22 PROCEDURE — 86901 BLOOD TYPING SEROLOGIC RH(D): CPT

## 2021-12-22 PROCEDURE — 94760 N-INVAS EAR/PLS OXIMETRY 1: CPT

## 2021-12-22 RX ORDER — MAGNESIUM SULFATE 500 MG/ML
4 VIAL (ML) INJECTION ONCE
Refills: 0 | Status: DISCONTINUED | OUTPATIENT
Start: 2021-12-22 | End: 2021-12-23

## 2021-12-22 RX ORDER — SIMETHICONE 80 MG/1
80 TABLET, CHEWABLE ORAL EVERY 4 HOURS
Refills: 0 | Status: DISCONTINUED | OUTPATIENT
Start: 2021-12-22 | End: 2021-12-25

## 2021-12-22 RX ORDER — SODIUM CHLORIDE 9 MG/ML
1000 INJECTION, SOLUTION INTRAVENOUS
Refills: 0 | Status: DISCONTINUED | OUTPATIENT
Start: 2021-12-22 | End: 2021-12-25

## 2021-12-22 RX ORDER — KETOROLAC TROMETHAMINE 30 MG/ML
30 SYRINGE (ML) INJECTION EVERY 6 HOURS
Refills: 0 | Status: DISCONTINUED | OUTPATIENT
Start: 2021-12-22 | End: 2021-12-23

## 2021-12-22 RX ORDER — IBUPROFEN 200 MG
600 TABLET ORAL EVERY 6 HOURS
Refills: 0 | Status: COMPLETED | OUTPATIENT
Start: 2021-12-22 | End: 2022-11-20

## 2021-12-22 RX ORDER — NALOXONE HYDROCHLORIDE 4 MG/.1ML
0.1 SPRAY NASAL
Refills: 0 | Status: DISCONTINUED | OUTPATIENT
Start: 2021-12-22 | End: 2021-12-25

## 2021-12-22 RX ORDER — LABETALOL HCL 100 MG
20 TABLET ORAL ONCE
Refills: 0 | Status: COMPLETED | OUTPATIENT
Start: 2021-12-22 | End: 2021-12-22

## 2021-12-22 RX ORDER — OXYTOCIN 10 UNIT/ML
333.33 VIAL (ML) INJECTION
Qty: 20 | Refills: 0 | Status: DISCONTINUED | OUTPATIENT
Start: 2021-12-22 | End: 2021-12-23

## 2021-12-22 RX ORDER — OXYCODONE HYDROCHLORIDE 5 MG/1
5 TABLET ORAL
Refills: 0 | Status: DISCONTINUED | OUTPATIENT
Start: 2021-12-22 | End: 2021-12-22

## 2021-12-22 RX ORDER — TRANEXAMIC ACID 100 MG/ML
1000 INJECTION, SOLUTION INTRAVENOUS ONCE
Refills: 0 | Status: COMPLETED | OUTPATIENT
Start: 2021-12-22 | End: 2021-12-22

## 2021-12-22 RX ORDER — MAGNESIUM SULFATE 500 MG/ML
4 VIAL (ML) INJECTION ONCE
Refills: 0 | Status: COMPLETED | OUTPATIENT
Start: 2021-12-22 | End: 2021-12-22

## 2021-12-22 RX ORDER — DIPHENHYDRAMINE HCL 50 MG
25 CAPSULE ORAL EVERY 6 HOURS
Refills: 0 | Status: DISCONTINUED | OUTPATIENT
Start: 2021-12-22 | End: 2021-12-25

## 2021-12-22 RX ORDER — MAGNESIUM SULFATE 500 MG/ML
2 VIAL (ML) INJECTION
Qty: 40 | Refills: 0 | Status: DISCONTINUED | OUTPATIENT
Start: 2021-12-22 | End: 2021-12-23

## 2021-12-22 RX ORDER — ONDANSETRON 8 MG/1
4 TABLET, FILM COATED ORAL EVERY 6 HOURS
Refills: 0 | Status: DISCONTINUED | OUTPATIENT
Start: 2021-12-22 | End: 2021-12-25

## 2021-12-22 RX ORDER — OXYCODONE HYDROCHLORIDE 5 MG/1
5 TABLET ORAL
Refills: 0 | Status: DISCONTINUED | OUTPATIENT
Start: 2021-12-22 | End: 2021-12-25

## 2021-12-22 RX ORDER — HYDROMORPHONE HYDROCHLORIDE 2 MG/ML
1 INJECTION INTRAMUSCULAR; INTRAVENOUS; SUBCUTANEOUS
Refills: 0 | Status: DISCONTINUED | OUTPATIENT
Start: 2021-12-22 | End: 2021-12-25

## 2021-12-22 RX ORDER — OXYCODONE HYDROCHLORIDE 5 MG/1
10 TABLET ORAL
Refills: 0 | Status: DISCONTINUED | OUTPATIENT
Start: 2021-12-22 | End: 2021-12-25

## 2021-12-22 RX ORDER — LABETALOL HCL 100 MG
100 TABLET ORAL
Refills: 0 | Status: DISCONTINUED | OUTPATIENT
Start: 2021-12-22 | End: 2021-12-25

## 2021-12-22 RX ORDER — OXYCODONE HYDROCHLORIDE 5 MG/1
5 TABLET ORAL ONCE
Refills: 0 | Status: DISCONTINUED | OUTPATIENT
Start: 2021-12-22 | End: 2021-12-22

## 2021-12-22 RX ORDER — LANOLIN
1 OINTMENT (GRAM) TOPICAL EVERY 6 HOURS
Refills: 0 | Status: DISCONTINUED | OUTPATIENT
Start: 2021-12-22 | End: 2021-12-25

## 2021-12-22 RX ORDER — ACETAMINOPHEN 500 MG
1000 TABLET ORAL ONCE
Refills: 0 | Status: COMPLETED | OUTPATIENT
Start: 2021-12-22 | End: 2021-12-23

## 2021-12-22 RX ORDER — ACETAMINOPHEN 500 MG
975 TABLET ORAL
Refills: 0 | Status: DISCONTINUED | OUTPATIENT
Start: 2021-12-22 | End: 2021-12-25

## 2021-12-22 RX ORDER — SODIUM CHLORIDE 9 MG/ML
1000 INJECTION, SOLUTION INTRAVENOUS
Refills: 0 | Status: DISCONTINUED | OUTPATIENT
Start: 2021-12-22 | End: 2021-12-22

## 2021-12-22 RX ORDER — SODIUM CHLORIDE 9 MG/ML
1000 INJECTION, SOLUTION INTRAVENOUS
Refills: 0 | Status: DISCONTINUED | OUTPATIENT
Start: 2021-12-22 | End: 2021-12-23

## 2021-12-22 RX ORDER — ENOXAPARIN SODIUM 100 MG/ML
50 INJECTION SUBCUTANEOUS EVERY 24 HOURS
Refills: 0 | Status: DISCONTINUED | OUTPATIENT
Start: 2021-12-22 | End: 2021-12-25

## 2021-12-22 RX ORDER — CEFAZOLIN SODIUM 1 G
2000 VIAL (EA) INJECTION ONCE
Refills: 0 | Status: COMPLETED | OUTPATIENT
Start: 2021-12-22 | End: 2021-12-22

## 2021-12-22 RX ORDER — CITRIC ACID/SODIUM CITRATE 300-500 MG
15 SOLUTION, ORAL ORAL EVERY 6 HOURS
Refills: 0 | Status: DISCONTINUED | OUTPATIENT
Start: 2021-12-22 | End: 2021-12-23

## 2021-12-22 RX ORDER — MAGNESIUM HYDROXIDE 400 MG/1
30 TABLET, CHEWABLE ORAL
Refills: 0 | Status: DISCONTINUED | OUTPATIENT
Start: 2021-12-22 | End: 2021-12-25

## 2021-12-22 RX ORDER — TETANUS TOXOID, REDUCED DIPHTHERIA TOXOID AND ACELLULAR PERTUSSIS VACCINE, ADSORBED 5; 2.5; 8; 8; 2.5 [IU]/.5ML; [IU]/.5ML; UG/.5ML; UG/.5ML; UG/.5ML
0.5 SUSPENSION INTRAMUSCULAR ONCE
Refills: 0 | Status: DISCONTINUED | OUTPATIENT
Start: 2021-12-22 | End: 2021-12-25

## 2021-12-22 RX ADMIN — Medication 50 GM/HR: at 14:37

## 2021-12-22 RX ADMIN — SODIUM CHLORIDE 125 MILLILITER(S): 9 INJECTION, SOLUTION INTRAVENOUS at 14:15

## 2021-12-22 RX ADMIN — TRANEXAMIC ACID 220 MILLIGRAM(S): 100 INJECTION, SOLUTION INTRAVENOUS at 21:02

## 2021-12-22 RX ADMIN — Medication 100 MILLIGRAM(S): at 22:18

## 2021-12-22 RX ADMIN — Medication 300 GRAM(S): at 14:13

## 2021-12-22 RX ADMIN — Medication 100 MILLIGRAM(S): at 14:15

## 2021-12-22 RX ADMIN — Medication 20 MILLIGRAM(S): at 13:10

## 2021-12-22 NOTE — PATIENT PROFILE OB - FALL HARM RISK - UNIVERSAL INTERVENTIONS
Bed in lowest position, wheels locked, appropriate side rails in place/Call bell, personal items and telephone in reach/Instruct patient to call for assistance before getting out of bed or chair/Non-slip footwear when patient is out of bed/[place] to call system/Purposeful Proactive Rounding/Room/bathroom lighting operational, light cord in reach

## 2021-12-22 NOTE — PATIENT PROFILE OB - INTERPRETER'S NAME
Kwasi 33 year old male with hand infection secondary to dog bite yesterday. Will consult hand specialist, give IV antibiotics, and reassess. 33 year old male with hand infection secondary to dog bite yesterday. Will consult hand specialist, give IV antibiotics

## 2021-12-22 NOTE — PATIENT PROFILE OB - BREAST MILK IS MORE DIGESTIBLE, MAKING VOMITING, DIARRHEA, GAS AND CONSTIPATION LESS COMMON
----- Message from Jose Lu sent at 12/14/2017  9:56 AM CST -----  Contact: self/[phone]  Patient wants to make sure the appointment for next month is to remove the ear wax.  She wants this done before getting a hearing test.    Please call and advise.  
Spoke with patient and she was notified she will see Dr Miranda first to remove cerumen in ears and then have hearing test after for her appointment on 1/17/18. Patient verbalized understanding.   
Statement Selected

## 2021-12-22 NOTE — PATIENT PROFILE OB - ALERT: PERTINENT HISTORY
Patient desires tubal ligation/1st Trimester Sonogram/20 Week Level II Sonogram/BioPhysical Profile(s)/Non Invasive Prenatal Screen (NIPS)/Ultra Screen at 12 Weeks

## 2021-12-23 ENCOUNTER — APPOINTMENT (OUTPATIENT)
Dept: ANTEPARTUM | Facility: CLINIC | Age: 36
End: 2021-12-23

## 2021-12-23 LAB
ALBUMIN SERPL ELPH-MCNC: 2 G/DL — LOW (ref 3.3–5)
ALP SERPL-CCNC: 126 U/L — HIGH (ref 40–120)
ALT FLD-CCNC: 16 U/L — SIGNIFICANT CHANGE UP (ref 12–78)
ANION GAP SERPL CALC-SCNC: 7 MMOL/L — SIGNIFICANT CHANGE UP (ref 5–17)
AST SERPL-CCNC: 39 U/L — HIGH (ref 15–37)
BASOPHILS # BLD AUTO: 0.02 K/UL — SIGNIFICANT CHANGE UP (ref 0–0.2)
BASOPHILS NFR BLD AUTO: 0.2 % — SIGNIFICANT CHANGE UP (ref 0–2)
BILIRUB SERPL-MCNC: 0.3 MG/DL — SIGNIFICANT CHANGE UP (ref 0.2–1.2)
BUN SERPL-MCNC: 9 MG/DL — SIGNIFICANT CHANGE UP (ref 7–23)
CALCIUM SERPL-MCNC: 7.4 MG/DL — LOW (ref 8.5–10.1)
CHLORIDE SERPL-SCNC: 104 MMOL/L — SIGNIFICANT CHANGE UP (ref 96–108)
CO2 SERPL-SCNC: 23 MMOL/L — SIGNIFICANT CHANGE UP (ref 22–31)
CREAT SERPL-MCNC: 0.46 MG/DL — LOW (ref 0.5–1.3)
EOSINOPHIL # BLD AUTO: 0 K/UL — SIGNIFICANT CHANGE UP (ref 0–0.5)
EOSINOPHIL NFR BLD AUTO: 0 % — SIGNIFICANT CHANGE UP (ref 0–6)
GLUCOSE SERPL-MCNC: 89 MG/DL — SIGNIFICANT CHANGE UP (ref 70–99)
HCT VFR BLD CALC: 31 % — LOW (ref 34.5–45)
HGB BLD-MCNC: 10.2 G/DL — LOW (ref 11.5–15.5)
IMM GRANULOCYTES NFR BLD AUTO: 0.5 % — SIGNIFICANT CHANGE UP (ref 0–1.5)
LYMPHOCYTES # BLD AUTO: 0.92 K/UL — LOW (ref 1–3.3)
LYMPHOCYTES # BLD AUTO: 8.7 % — LOW (ref 13–44)
MAGNESIUM SERPL-MCNC: 3.8 MG/DL — HIGH (ref 1.6–2.6)
MAGNESIUM SERPL-MCNC: 5.2 MG/DL — HIGH (ref 1.6–2.6)
MAGNESIUM SERPL-MCNC: 5.3 MG/DL — HIGH (ref 1.6–2.6)
MCHC RBC-ENTMCNC: 27.8 PG — SIGNIFICANT CHANGE UP (ref 27–34)
MCHC RBC-ENTMCNC: 32.9 GM/DL — SIGNIFICANT CHANGE UP (ref 32–36)
MCV RBC AUTO: 84.5 FL — SIGNIFICANT CHANGE UP (ref 80–100)
MONOCYTES # BLD AUTO: 0.66 K/UL — SIGNIFICANT CHANGE UP (ref 0–0.9)
MONOCYTES NFR BLD AUTO: 6.3 % — SIGNIFICANT CHANGE UP (ref 2–14)
NEUTROPHILS # BLD AUTO: 8.89 K/UL — HIGH (ref 1.8–7.4)
NEUTROPHILS NFR BLD AUTO: 84.3 % — HIGH (ref 43–77)
PLATELET # BLD AUTO: 175 K/UL — SIGNIFICANT CHANGE UP (ref 150–400)
POTASSIUM SERPL-MCNC: 4.2 MMOL/L — SIGNIFICANT CHANGE UP (ref 3.5–5.3)
POTASSIUM SERPL-SCNC: 4.2 MMOL/L — SIGNIFICANT CHANGE UP (ref 3.5–5.3)
PROT SERPL-MCNC: 5.9 GM/DL — LOW (ref 6–8.3)
RBC # BLD: 3.67 M/UL — LOW (ref 3.8–5.2)
RBC # FLD: 14.6 % — HIGH (ref 10.3–14.5)
SODIUM SERPL-SCNC: 134 MMOL/L — LOW (ref 135–145)
WBC # BLD: 10.54 K/UL — HIGH (ref 3.8–10.5)
WBC # FLD AUTO: 10.54 K/UL — HIGH (ref 3.8–10.5)

## 2021-12-23 RX ORDER — IBUPROFEN 200 MG
600 TABLET ORAL EVERY 6 HOURS
Refills: 0 | Status: DISCONTINUED | OUTPATIENT
Start: 2021-12-23 | End: 2021-12-25

## 2021-12-23 RX ORDER — OXYTOCIN 10 UNIT/ML
25 VIAL (ML) INJECTION
Qty: 20 | Refills: 0 | Status: DISCONTINUED | OUTPATIENT
Start: 2021-12-23 | End: 2021-12-25

## 2021-12-23 RX ORDER — SODIUM CHLORIDE 9 MG/ML
1000 INJECTION, SOLUTION INTRAVENOUS
Refills: 0 | Status: DISCONTINUED | OUTPATIENT
Start: 2021-12-23 | End: 2021-12-25

## 2021-12-23 RX ORDER — MAGNESIUM SULFATE 500 MG/ML
2.5 VIAL (ML) INJECTION
Qty: 40 | Refills: 0 | Status: DISCONTINUED | OUTPATIENT
Start: 2021-12-23 | End: 2021-12-25

## 2021-12-23 RX ADMIN — Medication 975 MILLIGRAM(S): at 16:25

## 2021-12-23 RX ADMIN — Medication 30 MILLIGRAM(S): at 06:11

## 2021-12-23 RX ADMIN — ONDANSETRON 4 MILLIGRAM(S): 8 TABLET, FILM COATED ORAL at 08:40

## 2021-12-23 RX ADMIN — Medication 975 MILLIGRAM(S): at 21:30

## 2021-12-23 RX ADMIN — Medication 100 MILLIGRAM(S): at 06:11

## 2021-12-23 RX ADMIN — Medication 62.5 GM/HR: at 10:58

## 2021-12-23 RX ADMIN — Medication 30 MILLIGRAM(S): at 06:30

## 2021-12-23 RX ADMIN — Medication 75 MILLIUNIT(S)/MIN: at 02:04

## 2021-12-23 RX ADMIN — Medication 30 MILLIGRAM(S): at 12:59

## 2021-12-23 RX ADMIN — Medication 1000 MILLIGRAM(S): at 02:24

## 2021-12-23 RX ADMIN — ENOXAPARIN SODIUM 50 MILLIGRAM(S): 100 INJECTION SUBCUTANEOUS at 10:57

## 2021-12-23 RX ADMIN — Medication 30 MILLIGRAM(S): at 18:15

## 2021-12-23 RX ADMIN — Medication 30 MILLIGRAM(S): at 18:20

## 2021-12-23 RX ADMIN — Medication 400 MILLIGRAM(S): at 02:02

## 2021-12-23 RX ADMIN — Medication 100 MILLIGRAM(S): at 18:29

## 2021-12-23 RX ADMIN — Medication 975 MILLIGRAM(S): at 22:00

## 2021-12-23 RX ADMIN — Medication 975 MILLIGRAM(S): at 15:29

## 2021-12-23 RX ADMIN — Medication 30 MILLIGRAM(S): at 13:50

## 2021-12-23 RX ADMIN — SODIUM CHLORIDE 75 MILLILITER(S): 9 INJECTION, SOLUTION INTRAVENOUS at 07:09

## 2021-12-23 NOTE — PROGRESS NOTE ADULT - ASSESSMENT
Assessment:  JOSE FLORIAN is a 36y s/p uncomplicated LTCS POD#1   Plan for Contraception: IUD  Breastfeeding: Patient is currently breastfeeding and formula     Problem List:  No pertinent past medical history      Plan:  1. Routine post-op care.   2. General diet.  3. Increase activity. Please ambulate patient TID today.   4. DVT ppx: SCDs.  5. Pain is well controlled. Continue pain management PRN.   6. Plan to discharge on POD# 3 according to the normal criteria.   7.  Remove dressing at 24hr post-op.   8. Continue with magnesium for 24 post op - goal range of 4-8

## 2021-12-23 NOTE — PROGRESS NOTE ADULT - SUBJECTIVE AND OBJECTIVE BOX
Name: JOSE FLORIAN  MRN: [id number]  Date Admitted: 21  Location: [facility] 227 01 ([facility])  Attending: Yanira Toribio    All: No Known Allergies    Post Partum Note:     JOSE FLORIAN is a 36y  s/p uncomplicated CS POD #1 due to pre-eclampsia w/ severe features    SUBJECTIVE:  No acute events overnight. Pain is well controlled with PRN pain medication. Patient has not yet started ambulating, has no issues voiding, or with PO intake. Has had flatus but no BM. Denies N/V. Patient is having minimal lochia.    She is both formula and breastfeeding and the baby is latching on.     OBJECTIVE:  Physical exam:  General: AOx3, NAD.  Heart: RRR. S1S2.  Lungs: CTABL. Good airflow bilaterally.   Abdomen: +BS, Soft, appropriately tender, nondistended, no guarding or rebound tenderness, firm uterine fundus at umbilicus, the incision is clean dry and intact. No erythema or discharge.  Ext: No calf tenderness bilaterally, Joss's sign negative, warm extremities.    Vital Signs Last 24 Hrs  T(C): 36.7 (23 Dec 2021 06:26), Max: 36.7 (23 Dec 2021 01:20)  T(F): 98 (23 Dec 2021 06:26), Max: 98 (23 Dec 2021 01:20)  HR: 81 (23 Dec 2021 06:26) (66 - 100)  BP: 137/79 (23 Dec 2021 06:26) (125/75 - 140/80)  BP(mean): --  RR: 18 (23 Dec 2021 06:26) (16 - 19)  SpO2: 97% (23 Dec 2021 06:26) (97% - 99%)    LABS:                        11.0   6.85  )-----------( 187      ( 22 Dec 2021 13:12 )             33.7

## 2021-12-24 ENCOUNTER — TRANSCRIPTION ENCOUNTER (OUTPATIENT)
Age: 36
End: 2021-12-24

## 2021-12-24 DIAGNOSIS — O14.93 UNSPECIFIED PRE-ECLAMPSIA, THIRD TRIMESTER: ICD-10-CM

## 2021-12-24 LAB
ALBUMIN SERPL ELPH-MCNC: 1.9 G/DL — LOW (ref 3.3–5)
ALP SERPL-CCNC: 101 U/L — SIGNIFICANT CHANGE UP (ref 40–120)
ALT FLD-CCNC: 21 U/L — SIGNIFICANT CHANGE UP (ref 12–78)
ANION GAP SERPL CALC-SCNC: 4 MMOL/L — LOW (ref 5–17)
AST SERPL-CCNC: 32 U/L — SIGNIFICANT CHANGE UP (ref 15–37)
BILIRUB SERPL-MCNC: 0.1 MG/DL — LOW (ref 0.2–1.2)
BUN SERPL-MCNC: 13 MG/DL — SIGNIFICANT CHANGE UP (ref 7–23)
CALCIUM SERPL-MCNC: 7.3 MG/DL — LOW (ref 8.5–10.1)
CHLORIDE SERPL-SCNC: 107 MMOL/L — SIGNIFICANT CHANGE UP (ref 96–108)
CO2 SERPL-SCNC: 27 MMOL/L — SIGNIFICANT CHANGE UP (ref 22–31)
CREAT SERPL-MCNC: 0.57 MG/DL — SIGNIFICANT CHANGE UP (ref 0.5–1.3)
GLUCOSE SERPL-MCNC: 78 MG/DL — SIGNIFICANT CHANGE UP (ref 70–99)
POTASSIUM SERPL-MCNC: 4 MMOL/L — SIGNIFICANT CHANGE UP (ref 3.5–5.3)
POTASSIUM SERPL-SCNC: 4 MMOL/L — SIGNIFICANT CHANGE UP (ref 3.5–5.3)
PROT SERPL-MCNC: 5.6 GM/DL — LOW (ref 6–8.3)
SODIUM SERPL-SCNC: 138 MMOL/L — SIGNIFICANT CHANGE UP (ref 135–145)

## 2021-12-24 RX ORDER — ACETAMINOPHEN 500 MG
3 TABLET ORAL
Qty: 0 | Refills: 0 | DISCHARGE
Start: 2021-12-24

## 2021-12-24 RX ORDER — LABETALOL HCL 100 MG
1 TABLET ORAL
Qty: 60 | Refills: 1
Start: 2021-12-24 | End: 2022-02-21

## 2021-12-24 RX ORDER — IBUPROFEN 200 MG
1 TABLET ORAL
Qty: 30 | Refills: 0
Start: 2021-12-24

## 2021-12-24 RX ORDER — IBUPROFEN 200 MG
1 TABLET ORAL
Qty: 0 | Refills: 0 | DISCHARGE
Start: 2021-12-24

## 2021-12-24 RX ADMIN — Medication 975 MILLIGRAM(S): at 09:20

## 2021-12-24 RX ADMIN — Medication 975 MILLIGRAM(S): at 15:07

## 2021-12-24 RX ADMIN — Medication 600 MILLIGRAM(S): at 12:44

## 2021-12-24 RX ADMIN — Medication 600 MILLIGRAM(S): at 18:45

## 2021-12-24 RX ADMIN — ENOXAPARIN SODIUM 50 MILLIGRAM(S): 100 INJECTION SUBCUTANEOUS at 12:43

## 2021-12-24 RX ADMIN — Medication 100 MILLIGRAM(S): at 18:45

## 2021-12-24 RX ADMIN — Medication 975 MILLIGRAM(S): at 03:02

## 2021-12-24 RX ADMIN — Medication 600 MILLIGRAM(S): at 00:10

## 2021-12-24 RX ADMIN — Medication 975 MILLIGRAM(S): at 21:00

## 2021-12-24 RX ADMIN — Medication 975 MILLIGRAM(S): at 22:36

## 2021-12-24 RX ADMIN — Medication 975 MILLIGRAM(S): at 15:08

## 2021-12-24 RX ADMIN — Medication 975 MILLIGRAM(S): at 03:00

## 2021-12-24 RX ADMIN — Medication 600 MILLIGRAM(S): at 12:45

## 2021-12-24 RX ADMIN — Medication 600 MILLIGRAM(S): at 01:00

## 2021-12-24 RX ADMIN — Medication 100 MILLIGRAM(S): at 06:46

## 2021-12-24 RX ADMIN — Medication 975 MILLIGRAM(S): at 09:19

## 2021-12-24 RX ADMIN — Medication 600 MILLIGRAM(S): at 18:46

## 2021-12-24 RX ADMIN — Medication 600 MILLIGRAM(S): at 06:46

## 2021-12-24 NOTE — DISCHARGE NOTE OB - NS MD DC FALL RISK RISK
For information on Fall & Injury Prevention, visit: [email or web address] OR  [email or web address] OR  https://www.cdc.gov/steadi/patient.html

## 2021-12-24 NOTE — PROGRESS NOTE ADULT - SUBJECTIVE AND OBJECTIVE BOX
JOSE FLORIAN is a 37yo  now POD#2 s/p repeat  section  to preeclampsia with severe features at 37 weeks 1 day gestation. She is s/p 24 hours of magnesium sulfate.     S:    The patient has no complaints. Pain controlled with medication   She is ambulating without difficulty and tolerating PO.   - flatus/-BM/+ voiding   Patient denies headache, visual disturbances, chest pain, SOB, and leg pain.     O:    T(C): 36.7 (21 @ 04:17), Max: 37 (21 @ 18:20)  HR: 74 (21 @ 04:17) (74 - 85)  BP: 115/69 (21 @ 04:17) (109/64 - 148/83)  RR: 18 (21 @ 04:17) (18 - 18)  SpO2: 100% (21 @ 04:17) (83% - 100%)      Gen: NAD, AOx3  CV: RRR  Pulm: CTAB  Breast: nontender, not engorged   Abdomen:  soft, non-tender, non-distended, +bowel sounds.  Uterus:  Fundus firm below umbilicus  Incision:  Clean/dry/intact with steri-strips in place with serosanguinous fluid  VE:  +lochia  Ext:  Non-tender, no edema                           10.2   10.54 )-----------( 175      ( 23 Dec 2021 08:24 )             31.0         134<L>  |  104  |  9   ----------------------------<  89  4.2   |  23  |  0.46<L>    Ca    7.4<L>      23 Dec 2021 08:24  Mg     5.2         TPro  5.9<L>  /  Alb  2.0<L>  /  TBili  0.3  /  DBili  x   /  AST  39<H>  /  ALT  16  /  AlkPhos  126<H>

## 2021-12-24 NOTE — DISCHARGE NOTE OB - PLAN OF CARE
Please call your provider in 1 week for wound check. Take medications as directed, regular diet, activity as tolerated. Exclusive breast feeding for the first 6 months is recommended. Nothing per vagina for 6 weeks (incl. sex, douching, etc). If you have additional concerns, please inform your provider. Continue taking your blood pressure twice a day. Call your provider to schedule a follow up visit in 3 days. Continue taking labetalol as prescribed. If you develop a headache, visual disturbance, shortness of breath, chest pain, right upper quadrant pain, or leg swelling, please inform your provider.

## 2021-12-24 NOTE — DISCHARGE NOTE OB - PATIENT PORTAL LINK FT
You can access the FollowMyHealth Patient Portal offered by [facility] by registering at the following website: [email or web address]. By joining [facility]’s FollowMyHealth portal, you will also be able to view your health information using other applications (apps) compatible with our system.

## 2021-12-24 NOTE — DISCHARGE NOTE OB - CARE PROVIDER_API CALL
Yanira Toribio)  Obstetrics and Gynecology  [address] [place] [address]  Phone: [phone]  Fax: [phone]  Established Patient  Follow Up Time:

## 2021-12-24 NOTE — DISCHARGE NOTE OB - HOSPITAL COURSE
She is now a  found to have preeclampsia with severe features and underwent a  repeat  section. She had an uncomplicated surgery and postpartum course. She received 24 hours of postpartum magnesium and was placed on labetalol 100mg BID. Upon discharge she was passing gas, tolerating PO, ambulating, and voiding spontaneously.

## 2021-12-24 NOTE — DISCHARGE NOTE OB - MEDICATION SUMMARY - MEDICATIONS TO TAKE
I will START or STAY ON the medications listed below when I get home from the hospital:    oxycodone-acetaminophen 5 mg-325 mg oral tablet  -- 1 tab(s) by mouth every 8 hours MDD:3  -- Caution federal law prohibits the transfer of this drug to any person other  than the person for whom it was prescribed.  May cause drowsiness.  Alcohol may intensify this effect.  Use care when operating dangerous machinery.  This prescription cannot be refilled.  This product contains acetaminophen.  Do not use  with any other product containing acetaminophen to prevent possible liver damage.  Using more of this medication than prescribed may cause serious breathing problems.    -- Indication: For severe pain    acetaminophen 325 mg oral tablet  -- 3 tab(s) by mouth every 6 hours, As Needed  -- Indication: For Pain    ibuprofen 600 mg oral tablet  -- 1 tab(s) by mouth every 6 hours, As Needed MDD:4   -- Do not take this drug if you are pregnant.  It is very important that you take or use this exactly as directed.  Do not skip doses or discontinue unless directed by your doctor.  May cause drowsiness or dizziness.  Obtain medical advice before taking any non-prescription drugs as some may affect the action of this medication.  Take with food or milk.    -- Indication: For Pain    labetalol 200 mg oral tablet  -- 1 tab(s) by mouth every 12 hours   -- Indication: For hypertension    ferrous sulfate 325 mg (65 mg elemental iron) oral delayed release tablet  -- 1 tab(s) by mouth 2 times a day   -- May discolor urine or feces.  Swallow whole.  Do not crush.    -- Indication: For anemia    MiraLax oral powder for reconstitution  -- 17 gram(s) by mouth once a day   -- Dilute this medication with liquid before administration.  It is very important that you take or use this exactly as directed.  Do not skip doses or discontinue unless directed by your doctor.    -- Indication: For Constipation    cyclobenzaprine 10 mg oral tablet  -- 1 tab(s) by mouth 3 times a day  -- May cause drowsiness.  Alcohol may intensify this effect.  Use care when operating dangerous machinery.  Obtain medical advice before taking any non-prescription drugs as some may affect the action of this medication.      -- Indication: For home med

## 2021-12-24 NOTE — DISCHARGE NOTE OB - CARE PLAN
1 Principal Discharge DX:	 delivery delivered  Assessment and plan of treatment:	Please call your provider in 1 week for wound check. Take medications as directed, regular diet, activity as tolerated. Exclusive breast feeding for the first 6 months is recommended. Nothing per vagina for 6 weeks (incl. sex, douching, etc). If you have additional concerns, please inform your provider.  Secondary Diagnosis:	Preeclampsia, third trimester  Assessment and plan of treatment:	Continue taking your blood pressure twice a day. Call your provider to schedule a follow up visit in 3 days. Continue taking labetalol as prescribed. If you develop a headache, visual disturbance, shortness of breath, chest pain, right upper quadrant pain, or leg swelling, please inform your provider.

## 2021-12-25 VITALS
RESPIRATION RATE: 17 BRPM | SYSTOLIC BLOOD PRESSURE: 124 MMHG | HEART RATE: 70 BPM | TEMPERATURE: 97 F | OXYGEN SATURATION: 97 % | DIASTOLIC BLOOD PRESSURE: 73 MMHG

## 2021-12-25 RX ORDER — INFLUENZA VIRUS VACCINE 15; 15; 15; 15 UG/.5ML; UG/.5ML; UG/.5ML; UG/.5ML
0.5 SUSPENSION INTRAMUSCULAR ONCE
Refills: 0 | Status: DISCONTINUED | OUTPATIENT
Start: 2021-12-25 | End: 2021-12-25

## 2021-12-25 RX ADMIN — Medication 100 MILLIGRAM(S): at 06:54

## 2021-12-25 RX ADMIN — Medication 975 MILLIGRAM(S): at 03:00

## 2021-12-25 RX ADMIN — Medication 600 MILLIGRAM(S): at 06:54

## 2021-12-25 RX ADMIN — Medication 600 MILLIGRAM(S): at 00:36

## 2021-12-25 RX ADMIN — Medication 600 MILLIGRAM(S): at 01:36

## 2021-12-25 NOTE — PROGRESS NOTE ADULT - SUBJECTIVE AND OBJECTIVE BOX
Delivery Progress Note    JOSE FLORIAN is a 37yo  now POD#3 s/p repeat  section 2/2 to preeclampsia with severe features at 37 weeks 1 day gestation.          SUBJECTIVE:  No acute events overnight. Pain is well controlled with PRN pain medication. No problems with ambulating, voiding, or PO intake. Has had flatus but no BM. Denies nausea and vomiting. Patient is having appropriate lochia which is decreasing. She is breastfeeding and the baby is latching on.     OBJECTIVE:  Physical exam:  Vital Signs Last 24 Hrs  T(C): 37.1 (25 Dec 2021 04:01), Max: 37.1 (25 Dec 2021 04:01)  T(F): 98.7 (25 Dec 2021 04:), Max: 98.7 (25 Dec 2021 04:)  HR: 71 (25 Dec 2021 04:) (66 - 75)  BP: 137/73 (25 Dec 2021 04:) (102/65 - 137/73)  BP(mean): --  RR: 18 (25 Dec 2021 04:) (18 - 18)  SpO2: 99% (25 Dec 2021 04:) (98% - 100%)  General: alert and oriented x3, no acute distress.  Heart: regular rate and rhythm, no murmurs, rubs or gallops appreciated.  Lungs: clear to auscultation bilaterally.   Abdomen: Soft, appropriately tender to palpitation, firm uterine fundus below umbilicus. Incision appears dry and intact.  Extremities: non-tender, no edema.    Labs:                         10.2   10.54 )-----------( 175      ( 23 Dec 2021 08:24 )             31.0         ASSESSMENT  37yo  now POD#2 s/p repeat  section 2/2 to preeclampsia with severe features at 37 weeks 1 day gestation. She is s/p 24h magnesium sulfate.      PLAN  1. Routine post-op care  - Continue to encourage ambulation, PO intake and breastfeeding  - DVT prophylaxis: lovenox  - Continue pain management PRN  - Plan to discharge today per attending approval    2. Preeclampsia with severe features  - received 24 hours of magnesium  - BPs normotensive; 124/73 measured at bedside this am  - AST 20>39>32    Delivery Progress Note    JOSE FLORIAN is a 35yo  now POD#3 s/p repeat  section 2/2 to preeclampsia with severe features at 37 weeks 1 day gestation.          SUBJECTIVE:  No acute events overnight. Pain is well controlled with PRN pain medication. No problems with ambulating, voiding, or PO intake. Has had flatus but no BM. Denies nausea and vomiting. Patient is having appropriate lochia which is decreasing. She is breastfeeding and the baby is latching on.     OBJECTIVE:  Physical exam:  Vital Signs Last 24 Hrs  T(C): 37.1 (25 Dec 2021 04:01), Max: 37.1 (25 Dec 2021 04:01)  T(F): 98.7 (25 Dec 2021 04:), Max: 98.7 (25 Dec 2021 04:)  HR: 71 (25 Dec 2021 04:) (66 - 75)  BP: 137/73 (25 Dec 2021 04:) (102/65 - 137/73)  BP(mean): --  RR: 18 (25 Dec 2021 04:) (18 - 18)  SpO2: 99% (25 Dec 2021 04:) (98% - 100%)  General: alert and oriented x3, no acute distress.  Heart: regular rate and rhythm, no murmurs, rubs or gallops appreciated.  Lungs: clear to auscultation bilaterally.   Abdomen: Soft, appropriately tender to palpitation, firm uterine fundus below umbilicus. Incision appears dry and intact.  Extremities: non-tender, no edema.    Labs:                         10.2   10.54 )-----------( 175      ( 23 Dec 2021 08:24 )             31.0         ASSESSMENT  35yo  now POD#3 s/p repeat  section 2/2 to preeclampsia with severe features at 37 weeks 1 day gestation. She is s/p 24h magnesium sulfate.      PLAN  1. Routine post-op care  - Continue to encourage ambulation, PO intake and breastfeeding  - DVT prophylaxis: lovenox  - Continue pain management PRN  - Plan to discharge today per attending approval    2. Preeclampsia with severe features  - received 24 hours of magnesium  - BPs normotensive; 124/73 measured at bedside this am  - AST 20>39>32  [id number] 1 tab po bid

## 2021-12-29 DIAGNOSIS — O34.211 MATERNAL CARE FOR LOW TRANSVERSE SCAR FROM PREVIOUS CESAREAN DELIVERY: ICD-10-CM

## 2021-12-29 DIAGNOSIS — E66.01 MORBID (SEVERE) OBESITY DUE TO EXCESS CALORIES: ICD-10-CM

## 2021-12-29 DIAGNOSIS — Z3A.36 36 WEEKS GESTATION OF PREGNANCY: ICD-10-CM

## 2021-12-30 ENCOUNTER — APPOINTMENT (OUTPATIENT)
Dept: ANTEPARTUM | Facility: CLINIC | Age: 36
End: 2021-12-30

## 2022-01-07 ENCOUNTER — APPOINTMENT (OUTPATIENT)
Dept: ANTEPARTUM | Facility: CLINIC | Age: 37
End: 2022-01-07

## 2022-01-14 ENCOUNTER — APPOINTMENT (OUTPATIENT)
Dept: ANTEPARTUM | Facility: CLINIC | Age: 37
End: 2022-01-14

## 2022-02-14 ENCOUNTER — APPOINTMENT (OUTPATIENT)
Dept: MATERNAL FETAL MEDICINE | Facility: CLINIC | Age: 37
End: 2022-02-14
Payer: MEDICAID

## 2022-02-14 DIAGNOSIS — Z83.3 FAMILY HISTORY OF DIABETES MELLITUS: ICD-10-CM

## 2022-02-14 DIAGNOSIS — Z30.09 ENCOUNTER FOR OTHER GENERAL COUNSELING AND ADVICE ON CONTRACEPTION: ICD-10-CM

## 2022-02-14 DIAGNOSIS — Z82.49 FAMILY HISTORY OF ISCHEMIC HEART DISEASE AND OTHER DISEASES OF THE CIRCULATORY SYSTEM: ICD-10-CM

## 2022-02-14 PROCEDURE — 99213 OFFICE O/P EST LOW 20 MIN: CPT | Mod: 95

## 2022-02-14 RX ORDER — KRILL/OM-3/DHA/EPA/PHOSPHO/AST 1000-230MG
81 CAPSULE ORAL DAILY
Qty: 30 | Refills: 5 | Status: DISCONTINUED | COMMUNITY
Start: 2021-08-16 | End: 2022-02-14

## 2022-02-14 NOTE — HISTORY OF PRESENT ILLNESS
[FreeTextEntry1] :  Wendy Husain [id number]\par Pt location: home in [facility]\par Provider location office in [facility]\par \par 35 yo P6 (NSVDx6) s/p   (pt is NOT currently breast feeding). PMH significant for HTN.\par \par Presenting for consultation for IUD. Pt is unsure which type of IUD. We discussed hormonal vs nonhormonal options. Hormonal side effects of less bleeding/cramping reviewed. Side effects of copper reviewed including increased bleeding/cramping and maintenance of regular cycles. Pt is interested in copper IUD. Copper IUC insertion\par \par The patient was counseled on the risks, benefits and alternatives to the Copper IUD.  The  patient understand the risks of infection, bleeding and trauma (specifically uterine perforation with need for laparoscopic retrieval of IUD) and the small chance of IUD embedment, incorrect placement, incomplete removal and expulsion.  They were counseled on the small chance of uterine perforation, embedment and incorrect placement, incomplete removal, and that laparoscopy or hysteroscopy might be necessary to remove the IUD.\par \par \par

## 2022-02-14 NOTE — PLAN
[FreeTextEntry1] : 35 yo P6 desiring IUD s/p counseling now decided on Copper IUD.\par \par - Plan to get prior authorization and schedule insertion.\par I spent 25 minutes face to face counseling the patient on different IUDs, side effects, risks and benefits.\par \par

## 2022-04-26 ENCOUNTER — APPOINTMENT (OUTPATIENT)
Dept: OBGYN | Facility: CLINIC | Age: 37
End: 2022-04-26
Payer: MEDICAID

## 2022-04-26 VITALS
HEIGHT: 64 IN | BODY MASS INDEX: 34.31 KG/M2 | WEIGHT: 201 LBS | SYSTOLIC BLOOD PRESSURE: 126 MMHG | DIASTOLIC BLOOD PRESSURE: 86 MMHG

## 2022-04-26 DIAGNOSIS — Z01.419 ENCOUNTER FOR GYNECOLOGICAL EXAMINATION (GENERAL) (ROUTINE) W/OUT ABNORMAL FINDINGS: ICD-10-CM

## 2022-04-26 DIAGNOSIS — Z30.09 ENCOUNTER FOR OTHER GENERAL COUNSELING AND ADVICE ON CONTRACEPTION: ICD-10-CM

## 2022-04-26 PROCEDURE — 90471 IMMUNIZATION ADMIN: CPT

## 2022-04-26 PROCEDURE — 81025 URINE PREGNANCY TEST: CPT

## 2022-04-26 PROCEDURE — 90651 9VHPV VACCINE 2/3 DOSE IM: CPT

## 2022-04-26 PROCEDURE — 99395 PREV VISIT EST AGE 18-39: CPT | Mod: 25

## 2022-04-27 PROBLEM — Z78.9 OTHER SPECIFIED HEALTH STATUS: Chronic | Status: ACTIVE | Noted: 2021-10-25

## 2022-04-27 NOTE — PHYSICAL EXAM
[Chaperone Present] : A chaperone was present in the examining room during all aspects of the physical examination [Appropriately responsive] : appropriately responsive [Alert] : alert [No Acute Distress] : no acute distress [No Lymphadenopathy] : no lymphadenopathy [Soft] : soft [Non-tender] : non-tender [Non-distended] : non-distended [Oriented x3] : oriented x3 [Examination Of The Breasts] : a normal appearance [No Discharge] : no discharge [No Masses] : no breast masses were palpable [Labia Majora] : normal [Labia Minora] : normal [No Bleeding] : There was no active vaginal bleeding [Normal] : normal [Uterine Adnexae] : non-palpable [FreeTextEntry1] : heber

## 2022-04-27 NOTE — REVIEW OF SYSTEMS
[Patient Intake Form Reviewed] : Patient intake form was reviewed [Fever] : no fever [Chills] : no chills [Dyspnea] : no dyspnea [Chest Pain] : no chest pain [Abdominal Pain] : no abdominal pain [Abn Vaginal bleeding] : no abnormal vaginal bleeding [Headache] : no headache [Depression] : no depression

## 2022-04-27 NOTE — DISCUSSION/SUMMARY
[FreeTextEntry1] :   The benefits of adequate calcium intake and a daily multivitamin along with routine daily cardiovascular exercise were reviewed with the patient.\par  She desires paragard IUD insertion. \par   The importance of safer-sex was discussed with the patient.\par We reviewed ASCCP/ACOG guidelines for pap smears. \par RTO for IUD insertion. \par RTO for 2nd gardasil.

## 2022-04-27 NOTE — HISTORY OF PRESENT ILLNESS
[Condoms] : uses condoms [Y] : Positive pregnancy history [Menarche Age: ____] : age at menarche was [unfilled] [Yes] : Patient has concerns regarding sex [Currently Active] : currently active [Patient would like to be screened for STIs] : Patient would like to be screened for STIs [Mammogramdate] : 04/16/21 [TextBox_19] : BR1 [PapSmeardate] : 02/2021 [TextBox_31] : ABNORMAL PER PT [HPVDate] : 02/2021 [LMPDate] : 04/12/22 [PGxTotal] : 6 [[facility] : 6 [[facility]Living] : 6 [FreeTextEntry1] : 04/12/22

## 2022-05-02 ENCOUNTER — NON-APPOINTMENT (OUTPATIENT)
Age: 37
End: 2022-05-02

## 2022-05-02 LAB
HCG UR QL: NEGATIVE
QUALITY CONTROL: YES

## 2022-05-03 ENCOUNTER — NON-APPOINTMENT (OUTPATIENT)
Age: 37
End: 2022-05-03

## 2022-05-04 PROBLEM — Z78.9 OTHER SPECIFIED HEALTH STATUS: Chronic | Status: ACTIVE | Noted: 2019-04-30

## 2022-05-06 LAB
C TRACH RRNA SPEC QL NAA+PROBE: NOT DETECTED
CYTOLOGY CVX/VAG DOC THIN PREP: ABNORMAL
HAV IGM SER QL: NONREACTIVE
HBV CORE IGM SER QL: NONREACTIVE
HBV SURFACE AG SER QL: NONREACTIVE
HCV AB SER QL: NONREACTIVE
HCV S/CO RATIO: 0.11 S/CO
HIV1+2 AB SPEC QL IA.RAPID: NONREACTIVE
HPV HIGH+LOW RISK DNA PNL CVX: DETECTED
N GONORRHOEA RRNA SPEC QL NAA+PROBE: NOT DETECTED
SOURCE TP AMPLIFICATION: NORMAL
T PALLIDUM AB SER QL IA: NEGATIVE

## 2022-05-16 ENCOUNTER — APPOINTMENT (OUTPATIENT)
Dept: OBGYN | Facility: CLINIC | Age: 37
End: 2022-05-16
Payer: MEDICAID

## 2022-05-16 ENCOUNTER — NON-APPOINTMENT (OUTPATIENT)
Age: 37
End: 2022-05-16

## 2022-05-16 ENCOUNTER — ASOB RESULT (OUTPATIENT)
Age: 37
End: 2022-05-16

## 2022-05-16 ENCOUNTER — APPOINTMENT (OUTPATIENT)
Age: 37
End: 2022-05-16
Payer: MEDICAID

## 2022-05-16 VITALS
HEIGHT: 64 IN | DIASTOLIC BLOOD PRESSURE: 90 MMHG | WEIGHT: 201 LBS | BODY MASS INDEX: 34.31 KG/M2 | SYSTOLIC BLOOD PRESSURE: 138 MMHG

## 2022-05-16 DIAGNOSIS — Z86.79 PERSONAL HISTORY OF OTHER DISEASES OF THE CIRCULATORY SYSTEM: ICD-10-CM

## 2022-05-16 DIAGNOSIS — Z78.9 OTHER SPECIFIED HEALTH STATUS: ICD-10-CM

## 2022-05-16 DIAGNOSIS — Z30.430 ENCOUNTER FOR INSERTION OF INTRAUTERINE CONTRACEPTIVE DEVICE: ICD-10-CM

## 2022-05-16 LAB
HCG UR QL: NEGATIVE
QUALITY CONTROL: YES

## 2022-05-16 PROCEDURE — 81025 URINE PREGNANCY TEST: CPT

## 2022-05-16 PROCEDURE — 76830 TRANSVAGINAL US NON-OB: CPT

## 2022-05-16 PROCEDURE — 58300 INSERT INTRAUTERINE DEVICE: CPT

## 2022-05-16 RX ORDER — CHOLECALCIFEROL (VITAMIN D3) 25 MCG
27-0.8-228 TABLET,CHEWABLE ORAL
Refills: 0 | Status: DISCONTINUED | COMMUNITY
Start: 2021-08-16 | End: 2022-05-16

## 2022-05-16 RX ORDER — LABETALOL HYDROCHLORIDE 200 MG/1
200 TABLET, FILM COATED ORAL
Refills: 0 | Status: ACTIVE | COMMUNITY

## 2022-05-16 RX ORDER — LABETALOL HYDROCHLORIDE 100 MG/1
100 TABLET, FILM COATED ORAL
Refills: 0 | Status: DISCONTINUED | COMMUNITY
Start: 2021-08-16 | End: 2022-05-16

## 2022-05-18 NOTE — REASON FOR VISIT
[[employer] ] : provided by [employer]   [FreeTextEntry2] : IUD INSERTION [Interpreters_IDNumber] : [id number] [Interpreters_FullName] : Tawanda [TWNoteComboBox1] : [place]

## 2022-05-18 NOTE — PROCEDURE
[IUD Placement] : intrauterine device (IUD) placement [Time out performed] : Pre-procedure time out performed.  Patient's name, date of birth and procedure confirmed. [Consent Obtained] : Consent obtained [Prevention of Pregnancy] : prevention of pregnancy [Risks] : risks [Benefits] : benefits [Alternatives] : alternatives [Patient] : patient [Infection] : infection [Bleeding] : bleeding [Pain] : pain [Expulsion] : expulsion [Failure] : failure [Uterine Perforation] : uterine perforation [Neg Pregnancy Test] : negative pregnancy test [Neg GC/Chlamydia] : negative GC/Chlamydia [Betadine] : Betadine [Tenaculum] : Tenaculum [Required Dilation] : required dilation [Sounded to ___ cm] : sounded to [unfilled] ~Ucm [Post Placement Transvag. US] : post placement transvaginal ultrasound [ParaGard IUD] : ParaGard IUD [Tolerated Well] : Patient tolerated the procedure well [No Complications] : No complications [None] : None [History of Unprotected [place]] : no history of unprotected intercourse [LMPDate] : 05/08/22 [de-identified] : Mild bleeding from tenaculum site on anterior cervix- Excellent hemostasis obtained with Monsel's solution. [de-identified] : [id number] [de-identified] : 4/2027 [de-identified] : 05/2032

## 2022-05-18 NOTE — PHYSICAL EXAM
[Chaperone Present] : A chaperone was present in the examining room during all aspects of the physical examination [Appropriately responsive] : appropriately responsive [Alert] : alert [No Acute Distress] : no acute distress [Oriented x3] : oriented x3 [Normal] : normal external genitalia [Labia Majora] : normal [Labia Minora] : normal [Pink Rugae] : pink rugae [No Bleeding] : There was no active vaginal bleeding [FreeTextEntry1] : FREDY LONDON

## 2022-05-18 NOTE — HISTORY OF PRESENT ILLNESS
[Condoms] : uses condoms [Y] : Positive pregnancy history [Menarche Age: ____] : age at menarche was [unfilled] [Yes] : Patient has concerns regarding sex [Currently Active] : currently active [Patient would like to be screened for STIs] : Patient would like to be screened for STIs [Mammogramdate] : 04/16/21 [TextBox_19] : BR1 [PapSmeardate] : 04/26/2022 [TextBox_31] : ASCUS [HPVDate] : 04/26/2022 [TextBox_78] : POSITIVE [LMPDate] : 05/08/2022 [PGxTotal] : 6 [[facility]] : 6 [[facility]Living] : 6 [FreeTextEntry1] : 05/08/2022

## 2022-05-18 NOTE — DISCUSSION/SUMMARY
[FreeTextEntry1] : -Patient desires insertion of Paragard IUD. The procedure details for IUD insertion, risks, benefits, and alternatives were discussed. RIsks of the procedure discussed included but are not limited to pain, bleeding, infection, uterine perforation, and injury to nearby organs. Possible side effects of Paragard IUD were discussed including dysmenorrhea and heavier menses. Consent for Paragard IUD was obtained. Please see procedure note above. Paragard IUD was placed without any issues and the patient tolerated the procedure well. Lot #: [id number], Exp: 4/2027. Post procedure pelvic ultrasound confirmed correct intrauterine placement.\par \par -Pelvic rest precautions for 1 week was recommended. Post-procedure expectations and call parameters were discussed.\par \par -h/o ASCUS, HPV HR positive on pap smear on 4/26/22- She is already scheduled for her colposcopy on 5/27/22.\par \par -Follow up in 2 weeks for colposcopy.\par \par All questions and concerns were discussed. Danish interpretation provided by: Tawanda, ID#: [id number].

## 2022-05-27 ENCOUNTER — APPOINTMENT (OUTPATIENT)
Dept: OBGYN | Facility: CLINIC | Age: 37
End: 2022-05-27
Payer: MEDICAID

## 2022-05-27 VITALS
WEIGHT: 201 LBS | BODY MASS INDEX: 34.31 KG/M2 | SYSTOLIC BLOOD PRESSURE: 118 MMHG | HEIGHT: 64 IN | DIASTOLIC BLOOD PRESSURE: 80 MMHG

## 2022-05-27 DIAGNOSIS — R87.810 ATYPICAL SQUAMOUS CELLS OF UNDETERMINED SIGNIFICANCE ON CYTOLOGIC SMEAR OF CERVIX (ASC-US): ICD-10-CM

## 2022-05-27 DIAGNOSIS — R87.610 ATYPICAL SQUAMOUS CELLS OF UNDETERMINED SIGNIFICANCE ON CYTOLOGIC SMEAR OF CERVIX (ASC-US): ICD-10-CM

## 2022-05-27 LAB
HCG UR QL: NEGATIVE
QUALITY CONTROL: YES

## 2022-05-27 PROCEDURE — 57454 BX/CURETT OF CERVIX W/SCOPE: CPT

## 2022-05-27 PROCEDURE — 81025 URINE PREGNANCY TEST: CPT

## 2022-05-29 NOTE — PROCEDURE
[Colposcopy] : Colposcopy  [Consent Obtained] : Consent obtained [Risks] : risks [Patient] : patient [ASCUS] : ASCUS [HPV High Risk] : HPV high risk [No Premedication] : no premedication [Colposcopy Adequate] : colposcopy adequate [SCI Fully Visualized] : SCI fully visualized [ECC Performed] : ECC performed [Lesion] : lesion seen [Biopsy] : biopsy taken [Hemostasis Obtained] : Hemostasis obtained [Tolerated Well] : the patient tolerated the procedure well [de-identified] : 3 [de-identified] : acetowhite 10-12, 6 [de-identified] : 12, 6, 10 [de-identified] : direct pressure, monsels [de-identified] : low grade changes

## 2022-06-20 ENCOUNTER — NON-APPOINTMENT (OUTPATIENT)
Age: 37
End: 2022-06-20

## 2022-06-21 LAB — CORE LAB BIOPSY: NORMAL

## 2022-06-27 ENCOUNTER — APPOINTMENT (OUTPATIENT)
Dept: OBGYN | Facility: CLINIC | Age: 37
End: 2022-06-27

## 2022-06-29 ENCOUNTER — APPOINTMENT (OUTPATIENT)
Dept: OBGYN | Facility: CLINIC | Age: 37
End: 2022-06-29

## 2022-06-30 ENCOUNTER — APPOINTMENT (OUTPATIENT)
Dept: OBGYN | Facility: CLINIC | Age: 37
End: 2022-06-30

## 2022-06-30 VITALS
HEIGHT: 64 IN | WEIGHT: 202 LBS | DIASTOLIC BLOOD PRESSURE: 70 MMHG | SYSTOLIC BLOOD PRESSURE: 120 MMHG | BODY MASS INDEX: 34.49 KG/M2

## 2022-06-30 LAB
HCG UR QL: NEGATIVE
QUALITY CONTROL: YES

## 2022-06-30 PROCEDURE — 90471 IMMUNIZATION ADMIN: CPT

## 2022-06-30 PROCEDURE — 81025 URINE PREGNANCY TEST: CPT

## 2022-06-30 PROCEDURE — 90651 9VHPV VACCINE 2/3 DOSE IM: CPT

## 2022-06-30 NOTE — COUNSELING
[Nutrition/ Exercise/ Weight Management] : nutrition, exercise, weight management [Vaccines] : vaccines Oriented - self; Oriented - place; Oriented - time

## 2022-07-25 NOTE — PATIENT PROFILE OB - HEIGHT IN FEET
-- DO NOT REPLY / DO NOT REPLY ALL --  -- Message is from the [facility]--    Transfer to RN      Chief Complaint:  Blood pressure 200/87 / lightheaded     Caller Information       Type Contact Phone/Fax    07/25/2022 12:45 PM CDT Phone (Incoming) Francine Cee (Self) [phone] (M)          Provider Name:  Dr. Charli MILLS Practice Site Name:  [facility]     Alternative Phone Number:  NA     [phone]

## 2022-09-19 NOTE — REVIEW OF SYSTEMS
What Type Of Note Output Would You Prefer (Optional)?: Standard Output What Is The Reason For Today's Visit?: Full Body Skin Examination What Is The Reason For Today's Visit? (Being Monitored For X): the development of new lesions How Severe Are Your Spot(S)?: mild [Negative] : Heme/Lymph

## 2022-10-24 ENCOUNTER — APPOINTMENT (OUTPATIENT)
Dept: OBGYN | Facility: CLINIC | Age: 37
End: 2022-10-24

## 2022-10-24 DIAGNOSIS — Z23 ENCOUNTER FOR IMMUNIZATION: ICD-10-CM

## 2022-10-24 DIAGNOSIS — Z71.85 ENCOUNTER FOR IMMUNIZATION SAFETY COUNSELING: ICD-10-CM

## 2022-10-24 LAB
HCG UR QL: NEGATIVE
QUALITY CONTROL: YES

## 2022-10-24 PROCEDURE — 90651 9VHPV VACCINE 2/3 DOSE IM: CPT

## 2022-10-24 PROCEDURE — 81025 URINE PREGNANCY TEST: CPT

## 2022-10-24 PROCEDURE — 90471 IMMUNIZATION ADMIN: CPT

## 2022-10-24 NOTE — HISTORY OF PRESENT ILLNESS
[FreeTextEntry1] : HERE FOR GARDASIL #3\par   ID [id number] [Currently Active] : currently active [Men] : men [No] : No [Patient refuses STI testing] : Patient refuses STI testing

## 2022-10-24 NOTE — DISCUSSION/SUMMARY
[FreeTextEntry1] : GARDASIL #3 \par GARDASIL 9, 0.5 CC IM IN LEFT DELTOID WITHOUT COMPLICATION\par  SIGNS AND SYMPTOMS OF AND PREVENTION OF STD'S D/W PATIENT. \par 11 MINUTES SPENT BY THE PROVIDER, INCLUSIVE OF ALL ISSUES RELATED TO THIS ENCOUNTER ON THE DATE OF SERVICE. \par  ALL QUESTIONS ANSWERED\par DISCUSSED PRECAUTIONS AGAINST COVID19, INCLUDING MASK WEARING, SOCIAL DISTANCING AND HAND WASHING.\par

## 2023-02-26 ENCOUNTER — EMERGENCY (EMERGENCY)
Facility: HOSPITAL | Age: 38
LOS: 0 days | Discharge: HOME | End: 2023-02-26
Attending: EMERGENCY MEDICINE
Payer: MEDICAID

## 2023-02-26 VITALS — HEART RATE: 67 BPM | DIASTOLIC BLOOD PRESSURE: 77 MMHG | SYSTOLIC BLOOD PRESSURE: 110 MMHG

## 2023-02-26 VITALS
TEMPERATURE: 98 F | WEIGHT: 164.91 LBS | SYSTOLIC BLOOD PRESSURE: 178 MMHG | OXYGEN SATURATION: 98 % | DIASTOLIC BLOOD PRESSURE: 96 MMHG | HEIGHT: 64 IN | HEART RATE: 75 BPM | RESPIRATION RATE: 18 BRPM

## 2023-02-26 DIAGNOSIS — R11.2 NAUSEA WITH VOMITING, UNSPECIFIED: ICD-10-CM

## 2023-02-26 DIAGNOSIS — R25.2 CRAMP AND SPASM: ICD-10-CM

## 2023-02-26 DIAGNOSIS — K76.0 FATTY (CHANGE OF) LIVER, NOT ELSEWHERE CLASSIFIED: ICD-10-CM

## 2023-02-26 DIAGNOSIS — R42 DIZZINESS AND GIDDINESS: ICD-10-CM

## 2023-02-26 DIAGNOSIS — Z97.5 PRESENCE OF (INTRAUTERINE) CONTRACEPTIVE DEVICE: ICD-10-CM

## 2023-02-26 LAB
ADD ON TEST-SPECIMEN IN LAB: SIGNIFICANT CHANGE UP
ALBUMIN SERPL ELPH-MCNC: 3.8 G/DL — SIGNIFICANT CHANGE UP (ref 3.3–5)
ALP SERPL-CCNC: 115 U/L — SIGNIFICANT CHANGE UP (ref 40–120)
ALT FLD-CCNC: 74 U/L — SIGNIFICANT CHANGE UP (ref 12–78)
ANION GAP SERPL CALC-SCNC: 6 MMOL/L — SIGNIFICANT CHANGE UP (ref 5–17)
APTT BLD: 27.1 SEC — LOW (ref 27.5–35.5)
AST SERPL-CCNC: 53 U/L — HIGH (ref 15–37)
BASOPHILS # BLD AUTO: 0.01 K/UL — SIGNIFICANT CHANGE UP (ref 0–0.2)
BASOPHILS NFR BLD AUTO: 0.1 % — SIGNIFICANT CHANGE UP (ref 0–2)
BILIRUB SERPL-MCNC: 0.1 MG/DL — LOW (ref 0.2–1.2)
BUN SERPL-MCNC: 7 MG/DL — SIGNIFICANT CHANGE UP (ref 7–23)
CALCIUM SERPL-MCNC: 8.6 MG/DL — SIGNIFICANT CHANGE UP (ref 8.5–10.1)
CHLORIDE SERPL-SCNC: 105 MMOL/L — SIGNIFICANT CHANGE UP (ref 96–108)
CO2 SERPL-SCNC: 25 MMOL/L — SIGNIFICANT CHANGE UP (ref 22–31)
CREAT SERPL-MCNC: 0.56 MG/DL — SIGNIFICANT CHANGE UP (ref 0.5–1.3)
D DIMER BLD IA.RAPID-MCNC: 165 NG/ML DDU — SIGNIFICANT CHANGE UP
EGFR: 120 ML/MIN/1.73M2 — SIGNIFICANT CHANGE UP
EOSINOPHIL # BLD AUTO: 0.11 K/UL — SIGNIFICANT CHANGE UP (ref 0–0.5)
EOSINOPHIL NFR BLD AUTO: 1.3 % — SIGNIFICANT CHANGE UP (ref 0–6)
GLUCOSE SERPL-MCNC: 121 MG/DL — HIGH (ref 70–99)
HCG SERPL-ACNC: <1 MIU/ML — SIGNIFICANT CHANGE UP
HCT VFR BLD CALC: 34 % — LOW (ref 34.5–45)
HGB BLD-MCNC: 10.9 G/DL — LOW (ref 11.5–15.5)
IMM GRANULOCYTES NFR BLD AUTO: 0.4 % — SIGNIFICANT CHANGE UP (ref 0–0.9)
INR BLD: 1.05 RATIO — SIGNIFICANT CHANGE UP (ref 0.88–1.16)
LYMPHOCYTES # BLD AUTO: 2.23 K/UL — SIGNIFICANT CHANGE UP (ref 1–3.3)
LYMPHOCYTES # BLD AUTO: 26.8 % — SIGNIFICANT CHANGE UP (ref 13–44)
MCHC RBC-ENTMCNC: 26.4 PG — LOW (ref 27–34)
MCHC RBC-ENTMCNC: 32.1 GM/DL — SIGNIFICANT CHANGE UP (ref 32–36)
MCV RBC AUTO: 82.3 FL — SIGNIFICANT CHANGE UP (ref 80–100)
MONOCYTES # BLD AUTO: 0.81 K/UL — SIGNIFICANT CHANGE UP (ref 0–0.9)
MONOCYTES NFR BLD AUTO: 9.7 % — SIGNIFICANT CHANGE UP (ref 2–14)
NEUTROPHILS # BLD AUTO: 5.14 K/UL — SIGNIFICANT CHANGE UP (ref 1.8–7.4)
NEUTROPHILS NFR BLD AUTO: 61.7 % — SIGNIFICANT CHANGE UP (ref 43–77)
PLATELET # BLD AUTO: 272 K/UL — SIGNIFICANT CHANGE UP (ref 150–400)
POTASSIUM SERPL-MCNC: 3.5 MMOL/L — SIGNIFICANT CHANGE UP (ref 3.5–5.3)
POTASSIUM SERPL-SCNC: 3.5 MMOL/L — SIGNIFICANT CHANGE UP (ref 3.5–5.3)
PROT SERPL-MCNC: 8.4 GM/DL — HIGH (ref 6–8.3)
PROTHROM AB SERPL-ACNC: 12.2 SEC — SIGNIFICANT CHANGE UP (ref 10.5–13.4)
RBC # BLD: 4.13 M/UL — SIGNIFICANT CHANGE UP (ref 3.8–5.2)
RBC # FLD: 15.8 % — HIGH (ref 10.3–14.5)
SODIUM SERPL-SCNC: 136 MMOL/L — SIGNIFICANT CHANGE UP (ref 135–145)
TROPONIN I, HIGH SENSITIVITY RESULT: 4.29 NG/L — SIGNIFICANT CHANGE UP
TROPONIN I, HIGH SENSITIVITY RESULT: 5.13 NG/L — SIGNIFICANT CHANGE UP
WBC # BLD: 8.33 K/UL — SIGNIFICANT CHANGE UP (ref 3.8–10.5)
WBC # FLD AUTO: 8.33 K/UL — SIGNIFICANT CHANGE UP (ref 3.8–10.5)

## 2023-02-26 PROCEDURE — 70450 CT HEAD/BRAIN W/O DYE: CPT | Mod: 26,MD

## 2023-02-26 PROCEDURE — 84484 ASSAY OF TROPONIN QUANT: CPT

## 2023-02-26 PROCEDURE — 74177 CT ABD & PELVIS W/CONTRAST: CPT | Mod: 26,MD

## 2023-02-26 PROCEDURE — 80053 COMPREHEN METABOLIC PANEL: CPT

## 2023-02-26 PROCEDURE — 85610 PROTHROMBIN TIME: CPT

## 2023-02-26 PROCEDURE — 84702 CHORIONIC GONADOTROPIN TEST: CPT

## 2023-02-26 PROCEDURE — 36415 COLL VENOUS BLD VENIPUNCTURE: CPT

## 2023-02-26 PROCEDURE — 99285 EMERGENCY DEPT VISIT HI MDM: CPT

## 2023-02-26 PROCEDURE — 85025 COMPLETE CBC W/AUTO DIFF WBC: CPT

## 2023-02-26 PROCEDURE — 93005 ELECTROCARDIOGRAM TRACING: CPT

## 2023-02-26 PROCEDURE — 85379 FIBRIN DEGRADATION QUANT: CPT

## 2023-02-26 PROCEDURE — 99285 EMERGENCY DEPT VISIT HI MDM: CPT | Mod: 25

## 2023-02-26 PROCEDURE — 93010 ELECTROCARDIOGRAM REPORT: CPT

## 2023-02-26 PROCEDURE — 70450 CT HEAD/BRAIN W/O DYE: CPT | Mod: MD

## 2023-02-26 PROCEDURE — 74177 CT ABD & PELVIS W/CONTRAST: CPT | Mod: MD

## 2023-02-26 PROCEDURE — 85730 THROMBOPLASTIN TIME PARTIAL: CPT

## 2023-02-26 RX ORDER — SODIUM CHLORIDE 9 MG/ML
1000 INJECTION INTRAMUSCULAR; INTRAVENOUS; SUBCUTANEOUS ONCE
Refills: 0 | Status: COMPLETED | OUTPATIENT
Start: 2023-02-26 | End: 2023-02-26

## 2023-02-26 RX ADMIN — SODIUM CHLORIDE 1000 MILLILITER(S): 9 INJECTION INTRAMUSCULAR; INTRAVENOUS; SUBCUTANEOUS at 03:03

## 2023-02-26 NOTE — ED ADULT NURSE NOTE - OBJECTIVE STATEMENT
Pt A&Ox4, VSS. Presents to ED with dizziness, N/V about 1 hour prior to arrival. Pt states she suddenly became dizzy and had about 3 episodes of emesis at home; pt with x1 episode of emesis in ED. Denies abdominal pain, headache, trouble urinating or moving bowels, CP, SOB. Labs collected, PIV placed. Medications administered as per MAR. Safety maintained, dispo pending.

## 2023-02-26 NOTE — ED PROVIDER NOTE - OBJECTIVE STATEMENT
37 year old female with no pertinent PMH presents with cc of dizziness since yesterday, as well as nausea, vomiting and diffuse cramping in the abdomen and in the flanks. No fever or chills. Nausea, and vomiting. No hematemesis. No dysuria hematuria or frequency. No vaginal bleeding. no recent instrumentation or surgery of the chest or the abdomen. No recent trauma. No IVDU. No ETOH abuse. No visual or focal neurological complaints. No saddle anesthesia. No incontinence of urine or stool. No difficulty ambulating. No motor weakness in arms or legs. No fever or chills. No HA. No neck pain or stiffness. No other complaints other than stated. Denies being pregnant. No known hx of STD/STIs. Abnormal Fetal Heart Rate Category II

## 2023-02-26 NOTE — ED PROVIDER NOTE - CLINICAL SUMMARY MEDICAL DECISION MAKING FREE TEXT BOX
dizziness, r/o ICH, CT done no acute findings  abdominal pain, N/V, r/o gastric or bowel related pathology such as appy, choly, torsion, etc, will do CT, lab. Provided patient with the results of the labs and imaging. Outpatient follow up and strict return precautions provided for patient.

## 2023-02-26 NOTE — ED PROVIDER NOTE - CARE PROVIDER_API CALL
Tadeo Hardy (MD)  Gastroenterology; Internal Medicine  [address] [facility], [place] [address]  Phone: [phone]  Fax: [phone]  Follow Up Time:

## 2023-02-26 NOTE — ED PROVIDER NOTE - PATIENT PORTAL LINK FT
You can access the FollowMyHealth Patient Portal offered by [facility] by registering at the following website: [email or web address]. By joining [employer]’s FollowMyHealth portal, you will also be able to view your health information using other applications (apps) compatible with our system.

## 2023-02-26 NOTE — ED PROVIDER NOTE - DIFFERENTIAL DIAGNOSIS
dizziness, r/o ICH, CT done no acute findings  abdominal pain, N/V, r/o gastric or bowel related pathology such as appy, choly, torsion, etc, will do CT, lab. Provided patient with the results of the labs and imaging. Outpatient follow up and strict return precautions provided for patient. Differential Diagnosis

## 2023-02-26 NOTE — ED PROVIDER NOTE - NSFOLLOWUPCLINICS_GEN_ALL_ED_FT
[facility]  Family Medicine  [address] [place] [address]  Phone: [phone]  Fax:

## 2023-02-26 NOTE — ED PROVIDER NOTE - NSFOLLOWUPINSTRUCTIONS_ED_ALL_ED_FT
Please note that I have discussed with you the results of your labs, imaging.  I have discussed with you all the findings of the CAT scan, including all incidental findings.  Please note that you have been observed in the emergency room for the last 4 hours.  You currently have no nausea, vomiting, chest pain, abdominal pain, or any other health concerns.  Please note that even though the CAT scan reports are unremarkable, labs are otherwise unremarkable for any emergent findings, that sometimes certain medical conditions can be missed, so if you have any return of your symptoms, or if any other health issues or emergencies return to us immediately.  Please make sure you follow-up with your primary care physician, if you do not have 1 I have provided you with the name of an outpatient clinic.    ______________________      Dolor abdominal en los adultos    Abdominal Pain, Adult      El dolor en el abdomen (dolor abdominal) puede tener muchas causas. A menudo, el dolor abdominal no es grave y mejora sin tratamiento o con tratamiento en la casa. Sin embargo, a veces el dolor abdominal es grave.    El médico le hará preguntas sobre sunny antecedentes médicos y le hará un examen físico para tratar de determinar la causa del dolor abdominal.      Siga estas instrucciones en [place] casa:     Medicamentos     •Use los medicamentos de venta kenan y los recetados solamente last se lo haya indicado el médico.      • No tome un laxante a menos que se lo haya indicado el médico.      Instrucciones generales     •Controle [place] afección para detectar cualquier cambio.      •Lanie suficiente líquido last para mantener la orina de color amarillo pálido.      •Concurra a todas las visitas de seguimiento last se lo haya indicado el médico. Dooms es importante.        Comuníquese con un médico si:    •El dolor abdominal cambia o empeora.      •No tiene apetito o baja de peso sin proponérselo.      •Está estreñido o tiene diarrea sujatha más de 2 o 3 días.      •Tiene dolor cuando orina o defeca.      •El dolor abdominal lo despierta de noche.      •El dolor empeora con las comidas, después de comer o con determinados alimentos.      •Tiene vómitos y no puede retener nada de lo que ingiere.      •Tiene fiebre.      •Observa kuldeep en la orina.        Solicite ayuda inmediatamente si:    •El dolor no desaparece tan pronto last el médico le dijo que era esperable.      •No puede dejar de vomitar.      •El dolor se siente solo en zonas del abdomen, last el lado derecho o la parte inferior izquierda del abdomen. Si se localiza en la romero derecha, posiblemente podría tratarse de apendicitis.      •Las heces son sanguinolentas o de color tomasa, o de aspecto alquitranado.      •Tiene dolor intenso, cólicos o distensión abdominal.    •Tiene signos de deshidratación, last los siguientes:  •Orina de color oscuro, muy escasa o falta de orina.      •Labios agrietados.      •Sequedad de boca.      •Ojos hundidos.      •Somnolencia.      •Debilidad.        •Tiene dificultad para respirar o dolor en el pecho.        Resumen    •A menudo, el dolor abdominal no es grave y mejora sin tratamiento o con tratamiento en la casa. Sin embargo, a veces el dolor abdominal es grave.      •Controle [place] afección para detectar cualquier cambio.      •Use los medicamentos de venta kenan y los recetados solamente last se lo haya indicado el médico.      •Comuníquese con un médico si el dolor abdominal cambia o empeora.      •Busque ayuda de inmediato si tiene dolor intenso, cólicos o distensión abdominal.      Esta información no tiene last fin reemplazar el consejo del médico. Asegúrese de hacerle al médico cualquier pregunta que tenga.    ________________

## 2023-02-26 NOTE — ED PROVIDER NOTE - PROGRESS NOTE DETAILS
Landon DODD: Nontoxic appearing, tolerating PO, ambulatory with no difficulty at the time of the dc, provided patient with copies of labs and imaging including discussion of all incidental findings.

## 2023-02-26 NOTE — ED ADULT TRIAGE NOTE - CHIEF COMPLAINT QUOTE
pt presents to the ED for dizziness since yesterday. reports nausea and vomiting. no other complaints reported at this time

## 2023-05-16 NOTE — PATIENT PROFILE OB - PROVIDER NOTIFICATION
Dr. Quan-  Would you refer them to [facility] for Excimer laser therapy ([facility]) and then they would go through them to see how much it costs? Would a PA need to be done through [facility]? Please advise. Thank you.    Deborat message:  This message is being sent by Mandie Najera on behalf of Chester Najera.     Good Afternoon,     I talked with my  about Chester’s vitiligo and we’re torn on what to do.  Is there any way for someone to get us information on what the cost would be to have the treatment done at [facility]? Ultimately if that’s going to cost a lot we would probably go the low dose steroid you suggested, but I do want to see our cost options.      Mandie Najera   Declines

## 2023-06-12 NOTE — ED STATDOCS - [facility] ED RELIEVING FACTORS
nothing
Eat healthy foods you enjoy. Rivaroxaban/Xarelto DOES NOT have a special diet. Limit your alcohol intake.

## 2023-10-25 NOTE — ED STATDOCS - NSDCPRINTRESULTS_ED_ALL_ED
Patient requests all Lab, Cardiology, and Radiology Results on their Discharge Instructions Complex Repair And Bilobe Flap Text: The defect edges were debeveled with a #15 scalpel blade.  The primary defect was closed partially with a complex linear closure.  Given the location of the remaining defect, shape of the defect and the proximity to free margins a bilobe flap was deemed most appropriate for complete closure of the defect.  Using a sterile surgical marker, an appropriate advancement flap was drawn incorporating the defect and placing the expected incisions within the relaxed skin tension lines where possible.    The area thus outlined was incised deep to adipose tissue with a #15 scalpel blade.  The skin margins were undermined to an appropriate distance in all directions utilizing iris scissors.

## 2023-12-19 NOTE — ED ADULT TRIAGE NOTE - BEFAST ARM SIDE DRIFT
Chief Complaints and History of Present Illnesses   Patient presents with    Follow Up     Pt here for 6 month follow up. Pt S/P right eye PPV by Yumiko combined with GENTRY implantation by Eleanor 4/17/23     Chief Complaint(s) and History of Present Illness(es)       Follow Up              Laterality: right eye    Comments: Pt here for 6 month follow up. Pt S/P right eye PPV by Yumiko combined with GENTRY implantation by Eleanor 4/17/23              Comments    Pt has largely unchanged vision since last exam. No new concerns.   Pt using:    FML BID both eyes    Cosopt BID both eyes    Xalatan (latanoprost) at bedtime both eyes    Alphagan BID left eye  Erythromycin.   Lisa Tovar, COA on 12/19/2023 at 7:52 AM                        No

## 2024-02-13 NOTE — ED ADULT NURSE NOTE - BEFAST ARM SIDE DRIFT
[facility] [place] [id number] [place] [address] [place] [address]  Phone: [phone]    Marian Hoang was seen and treated in our emergency department on 2/13/2024.  She may return to work on 02/16/2024.         Thank you for choosing [facility].    Jesenia Hobson MD      
No

## 2024-03-24 NOTE — DISCHARGE NOTE OB - BREAST MILK PROVIDES PROTECTION AGAINST INFECTION
Intrapartum Progress Note    Assessment/Plan   Avelina Moulton is a 25 y.o.  at 38w4d. MEI: 4/3/2024, by Last Menstrual Period.      Active phase of labor - recheck in 1-2 hours   Cat 2 tracing - overall reassuring   Anticipate SVB    Principal Problem:    Encounter for supervision of normal intrauterine pregnancy in primigravida, antepartum    Pregnancy Problems (from 10/24/23 to present)       Problem Noted Resolved    Encounter for supervision of normal intrauterine pregnancy in primigravida, antepartum 3/24/2024 by JORGE Pritchard-CHEKO No    Priority:  Medium              Subjective   Avelina is vomiting and feeling some pressure.    Objective   Last Vitals:  Temp Pulse Resp BP MAP Pulse Ox   37.6 °C (99.7 °F) (!) 114 18 117/71   100 %     Vitals Min/Max Last 24 Hours:  Temp  Min: 36.1 °C (97 °F)  Max: 37.6 °C (99.7 °F)  Pulse  Min: 73  Max: 127  Resp  Min: 18  Max: 20  BP  Min: 98/55  Max: 140/70    Intake/Output:    Intake/Output Summary (Last 24 hours) at 3/24/2024 1812  Last data filed at 3/24/2024 1640  Gross per 24 hour   Intake 497.58 ml   Output 550 ml   Net -52.42 ml       Physical Examination:  GENERAL: Examination reveals a well developed, well nourished, gravid female in no acute distress. She is alert and cooperative.  FHR is 145 Mod variability , with Accelerations, ? Variable decels with emesis and a Category II tracing - overall reassuring   [place] reading: q2/60/+2 palb   VAGINA: normal appearing vagina with normal color and discharge and no lesions noted  CERVIX: Lip/rim (Comment) cm dilated, 100 % effaced, 1 station; MEMBRANES are SROM.  Anterior lip is prominent.  NEUROLOGICAL: alert, oriented, normal speech, no focal findings or movement disorder noted  PSYCHOLOGICAL: awake and alert; oriented to person, place, and time    Lab Review:  Labs in chart were reviewed.   Statement Selected

## 2024-04-04 ENCOUNTER — LABORATORY RESULT (OUTPATIENT)
Age: 39
End: 2024-04-04

## 2024-04-04 ENCOUNTER — APPOINTMENT (OUTPATIENT)
Dept: OBGYN | Facility: CLINIC | Age: 39
End: 2024-04-04
Payer: MEDICAID

## 2024-04-04 VITALS
SYSTOLIC BLOOD PRESSURE: 116 MMHG | DIASTOLIC BLOOD PRESSURE: 78 MMHG | HEIGHT: 64 IN | BODY MASS INDEX: 40.8 KG/M2 | WEIGHT: 239 LBS

## 2024-04-04 DIAGNOSIS — Z01.411 ENCOUNTER FOR GYNECOLOGICAL EXAMINATION (GENERAL) (ROUTINE) WITH ABNORMAL FINDINGS: ICD-10-CM

## 2024-04-04 DIAGNOSIS — N92.0 EXCESSIVE AND FREQUENT MENSTRUATION WITH REGULAR CYCLE: ICD-10-CM

## 2024-04-04 DIAGNOSIS — N64.4 MASTODYNIA: ICD-10-CM

## 2024-04-04 DIAGNOSIS — Z12.4 ENCOUNTER FOR SCREENING FOR MALIGNANT NEOPLASM OF CERVIX: ICD-10-CM

## 2024-04-04 DIAGNOSIS — Z11.3 ENCOUNTER FOR SCREENING FOR INFECTIONS WITH A PREDOMINANTLY SEXUAL MODE OF TRANSMISSION: ICD-10-CM

## 2024-04-04 PROCEDURE — 99395 PREV VISIT EST AGE 18-39: CPT | Mod: 25

## 2024-04-04 PROCEDURE — 99213 OFFICE O/P EST LOW 20 MIN: CPT | Mod: 25

## 2024-04-04 NOTE — HISTORY OF PRESENT ILLNESS
[N] : Patient reports normal menses [IUD] : has an intrauterine device [Y] : Positive pregnancy history [Menarche Age: ____] : age at menarche was [unfilled] [No] : Patient does not have concerns regarding sex [Currently Active] : currently active [PapSmeardate] : 04/26/22 [TextBox_31] : ASCUS [HIVDate] : 4/26/22 [TextBox_53] : NEG [SyphilisDate] : 4/26/22 [TextBox_58] : NEG [GonorrheaDate] : 4/26/22 [TextBox_63] : NEG [ChlamydiaDate] : 4/26/22 [TextBox_68] : NEG [HPVDate] : 4/26/22 [TextBox_78] : POS [HepatitisBDate] : 4/26/22 [TextBox_83] : NEG [LMPDate] : 03/15/2024 [de-identified] : [place] [PGxTotal] : 6 [[facility]] : 6 [[facility]] : 6 [FreeTextEntry1] : 03/15/2024

## 2024-04-04 NOTE — PLAN
[FreeTextEntry1] : -F/u pap, STI screening and labs for abnormal vaginal bleeding -No bleeding on pelvic exam today  -RTO for TVUS, evaluate for structural concerns  -Rx for diagnostic mammo/BUS for pain; non tender on exam today, no masses, discussed changing bra, try without underwire, ensure good support  -Discussed SBE -Call or RTO PRN for any new problems, questions or concerns

## 2024-04-04 NOTE — PHYSICAL EXAM
[Chaperone Present] : A chaperone was present in the examining room during all aspects of the physical examination [Appropriately responsive] : appropriately responsive [Alert] : alert [No Acute Distress] : no acute distress [Soft] : soft [Non-tender] : non-tender [Non-distended] : non-distended [No Mass] : no mass [Oriented x3] : oriented x3 [Examination Of The Breasts] : a normal appearance [No Masses] : no breast masses were palpable [Labia Majora] : normal [Labia Minora] : normal [No Bleeding] : There was no active vaginal bleeding [IUD String] : an IUD string was noted [Normal] : normal [Tenderness] : nontender [Uterine Adnexae] : non-palpable

## 2024-04-04 NOTE — REVIEW OF SYSTEMS
[Negative] : Gastrointestinal [Breast Pain] : breast pain [Breast Lump] : no breast lump [Nipple Changes] : no nipple changes [Nipple Discharge] : no nipple discharge [FreeTextEntry8] : vaginal bleeding x 2 weeks with clots

## 2024-04-05 LAB
ESTRADIOL SERPL-MCNC: 67 PG/ML
FSH SERPL-MCNC: 3.2 IU/L
HCG SERPL-MCNC: <1 MIU/ML
HIV1+2 AB SPEC QL IA.RAPID: NONREACTIVE
T PALLIDUM AB SER QL IA: NEGATIVE
TSH SERPL-ACNC: 2.17 UIU/ML

## 2024-04-06 LAB
C TRACH RRNA SPEC QL NAA+PROBE: NOT DETECTED
HAV IGM SER QL: NONREACTIVE
HBV CORE IGM SER QL: NONREACTIVE
HBV SURFACE AG SER QL: NONREACTIVE
HCV AB SER QL: NONREACTIVE
HCV S/CO RATIO: 0.1 S/CO
N GONORRHOEA RRNA SPEC QL NAA+PROBE: NOT DETECTED
SOURCE TP AMPLIFICATION: NORMAL

## 2024-04-09 ENCOUNTER — OUTPATIENT (OUTPATIENT)
Dept: OUTPATIENT SERVICES | Facility: HOSPITAL | Age: 39
LOS: 1 days | End: 2024-04-09
Payer: MEDICAID

## 2024-04-09 ENCOUNTER — APPOINTMENT (OUTPATIENT)
Dept: MAMMOGRAPHY | Facility: CLINIC | Age: 39
End: 2024-04-09
Payer: MEDICAID

## 2024-04-09 ENCOUNTER — APPOINTMENT (OUTPATIENT)
Dept: ULTRASOUND IMAGING | Facility: CLINIC | Age: 39
End: 2024-04-09
Payer: MEDICAID

## 2024-04-09 ENCOUNTER — RESULT REVIEW (OUTPATIENT)
Age: 39
End: 2024-04-09

## 2024-04-09 DIAGNOSIS — N64.4 MASTODYNIA: ICD-10-CM

## 2024-04-09 PROCEDURE — 77066 DX MAMMO INCL CAD BI: CPT | Mod: 26

## 2024-04-09 PROCEDURE — 76641 ULTRASOUND BREAST COMPLETE: CPT

## 2024-04-09 PROCEDURE — G0279: CPT

## 2024-04-09 PROCEDURE — 76641 ULTRASOUND BREAST COMPLETE: CPT | Mod: 26,50

## 2024-04-09 PROCEDURE — G0279: CPT | Mod: 26

## 2024-04-09 PROCEDURE — 77066 DX MAMMO INCL CAD BI: CPT

## 2024-04-10 ENCOUNTER — ASOB RESULT (OUTPATIENT)
Age: 39
End: 2024-04-10

## 2024-04-10 ENCOUNTER — APPOINTMENT (OUTPATIENT)
Dept: ANTEPARTUM | Facility: CLINIC | Age: 39
End: 2024-04-10
Payer: MEDICAID

## 2024-04-10 ENCOUNTER — APPOINTMENT (OUTPATIENT)
Dept: OBGYN | Facility: CLINIC | Age: 39
End: 2024-04-10
Payer: MEDICAID

## 2024-04-10 VITALS
HEIGHT: 64 IN | WEIGHT: 239.2 LBS | SYSTOLIC BLOOD PRESSURE: 122 MMHG | BODY MASS INDEX: 40.84 KG/M2 | DIASTOLIC BLOOD PRESSURE: 76 MMHG

## 2024-04-10 DIAGNOSIS — R10.2 PELVIC AND PERINEAL PAIN: ICD-10-CM

## 2024-04-10 DIAGNOSIS — O10.919 UNSPECIFIED PRE-EXISTING HYPERTENSION COMPLICATING PREGNANCY, UNSPECIFIED TRIMESTER: ICD-10-CM

## 2024-04-10 DIAGNOSIS — Z3A.19 19 WEEKS GESTATION OF PREGNANCY: ICD-10-CM

## 2024-04-10 PROCEDURE — 76830 TRANSVAGINAL US NON-OB: CPT

## 2024-04-10 PROCEDURE — 76856 US EXAM PELVIC COMPLETE: CPT | Mod: 59

## 2024-04-10 PROCEDURE — 99215 OFFICE O/P EST HI 40 MIN: CPT

## 2024-04-10 RX ORDER — NORETHINDRONE ACETATE 5 MG/1
5 TABLET ORAL DAILY
Qty: 14 | Refills: 1 | Status: ACTIVE | COMMUNITY
Start: 2024-04-10 | End: 1900-01-01

## 2024-04-11 PROBLEM — R10.2 FEMALE PELVIC PAIN: Status: ACTIVE | Noted: 2024-04-11

## 2024-04-11 PROBLEM — O10.919 CHRONIC HYPERTENSION IN PREGNANCY: Status: RESOLVED | Noted: 2021-08-16 | Resolved: 2024-04-11

## 2024-04-11 LAB — CYTOLOGY CVX/VAG DOC THIN PREP: ABNORMAL

## 2024-04-11 NOTE — HISTORY OF PRESENT ILLNESS
[FreeTextEntry1] : Anastasia presents for follow up of heavy bleeding, Paragard IUD in place. She has had 6 children.  She reports that she has had a paragard before but never had this heavy, prolonged bleeding. She bleeds for over 2 weeks.  Sonogram today is showing 10cm uterus with IUD in place, 12mm endometrium, normal right and left ovary. Medical history is significant for morbid obesity BMI>40 and HTN.  Recent pap with +HPV, last pap with ASCUS +HPV  [id number]

## 2024-04-11 NOTE — DISCUSSION/SUMMARY
[FreeTextEntry1] : We reviewed her abnormal bleeding pattern and sonogram showing 12mm lining, paragard in place. Using a diagram, the relevant anatomy was reviewed. We discussed that abnormal bleeding should be evaluated with hysteroscopy EMB to rule out polyp/hyperplasia/malignancy. I recommend paragard removal, hysteroscopy, mirena insertion which will significantly improve the bleeding. In the meantime, she is requesting intervention for the bleeding. We will schedule her to come back for procedure soon and I have started her on norethindrone to control bleeding.  HPV pos persistent, rec colpo. We will perform the colpo on the same day per patient request.

## 2024-04-11 NOTE — COUNSELING
[Contraception/ Emergency Contraception/ Safe Sexual Practices] : contraception, emergency contraception, safe sexual practices [Lab Results] : lab results [Pre/Post Op Instructions] : pre/post op instructions

## 2024-04-12 LAB
BASOPHILS # BLD AUTO: 0.01 K/UL
BASOPHILS NFR BLD AUTO: 0.1 %
EOSINOPHIL # BLD AUTO: 0.09 K/UL
EOSINOPHIL NFR BLD AUTO: 1.3 %
HCT VFR BLD CALC: 31.8 %
HGB BLD-MCNC: 9.6 G/DL
HPV HIGH+LOW RISK DNA PNL CVX: DETECTED
IMM GRANULOCYTES NFR BLD AUTO: 0.6 %
LYMPHOCYTES # BLD AUTO: 2.12 K/UL
LYMPHOCYTES NFR BLD AUTO: 29.6 %
MAN DIFF?: NORMAL
MCHC RBC-ENTMCNC: 23.4 PG
MCHC RBC-ENTMCNC: 30.2 GM/DL
MCV RBC AUTO: 77.4 FL
MONOCYTES # BLD AUTO: 0.72 K/UL
MONOCYTES NFR BLD AUTO: 10.1 %
NEUTROPHILS # BLD AUTO: 4.18 K/UL
NEUTROPHILS NFR BLD AUTO: 58.3 %
PLATELET # BLD AUTO: 303 K/UL
RBC # BLD: 4.11 M/UL
RBC # FLD: 18.9 %
WBC # FLD AUTO: 7.16 K/UL

## 2024-05-22 ENCOUNTER — APPOINTMENT (OUTPATIENT)
Dept: OBGYN | Facility: CLINIC | Age: 39
End: 2024-05-22

## 2024-05-22 ENCOUNTER — APPOINTMENT (OUTPATIENT)
Dept: ANTEPARTUM | Facility: CLINIC | Age: 39
End: 2024-05-22
Payer: MEDICAID

## 2024-05-22 ENCOUNTER — ASOB RESULT (OUTPATIENT)
Age: 39
End: 2024-05-22

## 2024-05-22 VITALS
HEIGHT: 64 IN | DIASTOLIC BLOOD PRESSURE: 80 MMHG | BODY MASS INDEX: 41.16 KG/M2 | SYSTOLIC BLOOD PRESSURE: 128 MMHG | WEIGHT: 241.13 LBS

## 2024-05-22 DIAGNOSIS — Z30.432 ENCOUNTER FOR REMOVAL OF INTRAUTERINE CONTRACEPTIVE DEVICE: ICD-10-CM

## 2024-05-22 DIAGNOSIS — N93.9 ABNORMAL UTERINE AND VAGINAL BLEEDING, UNSPECIFIED: ICD-10-CM

## 2024-05-22 DIAGNOSIS — Z32.02 ENCOUNTER FOR PREGNANCY TEST, RESULT NEGATIVE: ICD-10-CM

## 2024-05-22 DIAGNOSIS — B97.7 PAPILLOMAVIRUS AS THE CAUSE OF DISEASES CLASSIFIED ELSEWHERE: ICD-10-CM

## 2024-05-22 DIAGNOSIS — Z30.430 ENCOUNTER FOR INSERTION OF INTRAUTERINE CONTRACEPTIVE DEVICE: ICD-10-CM

## 2024-05-22 LAB
HCG UR QL: NEGATIVE
QUALITY CONTROL: YES

## 2024-05-22 PROCEDURE — 76857 US EXAM PELVIC LIMITED: CPT | Mod: 59

## 2024-05-22 PROCEDURE — 57454 BX/CURETT OF CERVIX W/SCOPE: CPT

## 2024-05-22 PROCEDURE — 58301 REMOVE INTRAUTERINE DEVICE: CPT

## 2024-05-22 PROCEDURE — 81025 URINE PREGNANCY TEST: CPT

## 2024-05-22 PROCEDURE — 76830 TRANSVAGINAL US NON-OB: CPT

## 2024-05-22 PROCEDURE — 58300 INSERT INTRAUTERINE DEVICE: CPT

## 2024-05-22 PROCEDURE — 58558Z: CUSTOM

## 2024-05-24 NOTE — PROCEDURE
[Tenaculum] : Tenaculum [Easy Passage] : Easy passage [Mirena IUD] : Mirena IUD [Colposcopy] : Colposcopy  [HPV High Risk] : HPV high risk [No Premedication] : no premedication [Colposcopy Adequate] : colposcopy adequate [SCI Fully Visualized] : SCI fully visualized [ECC Performed] : ECC performed [Lesion] : lesion seen [Biopsy] : biopsy taken [Hemostasis Obtained] : Hemostasis obtained [Hysteroscopy] : Hysteroscopy [Abnormal uterine bleeding] : abnormal uterine bleeding [flexible] : Using aseptic technique a hysteroscopy was performed using a flexible hysteroscope [Sent to Pathology] : specimen was placed in buffered formalin and sent for pathology [Hemostasis obtained] : hemostasis obtained [Aftercare instructions/regstrictions given and follow-up scheduled] : Aftercare instructions/restrictions given and follow-up scheduled [IUD Placement] : intrauterine device (IUD) placement [Prevention of Pregnancy] : prevention of pregnancy [Neg Pregnancy Test] : negative pregnancy test [Neg GC/Chlamydia] : negative GC/Chlamydia [History of Unprotected [place]] : no history of unprotected intercourse [Betadine] : Betadine [Sounded to ___ cm] : sounded to [unfilled] ~Ucm [Post Placement Transvag. US] : post placement transvaginal ultrasound [US Guidance] : US Guidance [None] : None [IUD Removal] : intrauterine device (IUD) removal [Consent Obtained] : Consent obtained [Dysmenorrhea] : dysmenorrhea [Risks] : risks [Benefits] : benefits [Alternatives] : alternatives [Patient] : patient [Speculum Placed] : speculum placed [Strings Visualized] : strings visualized [IUD Discarded] : IUD discarded [Tolerated Well] : Patient tolerated the procedure well [No Complications] : no complications [___ Week(s)] : in [unfilled] week(s) [de-identified] : [id number] [de-identified] : 8/2026 [de-identified] : 1 [de-identified] : small acetowhite at 11 [de-identified] : cervix 11 o clock [de-identified] : pressure [de-identified] : cordelia tran [de-identified] : normal or low grade changes [de-identified] : saline infused into cavity, poor distension but no polyps noted. fluffy endometrium endometrial biopsy performed with endocurette 3mm and 2 passes were performed  [de-identified] : endometrial biopsy

## 2024-06-09 ENCOUNTER — NON-APPOINTMENT (OUTPATIENT)
Age: 39
End: 2024-06-09

## 2024-06-18 ENCOUNTER — NON-APPOINTMENT (OUTPATIENT)
Age: 39
End: 2024-06-18

## 2024-06-18 LAB — CORE LAB BIOPSY: NORMAL

## 2024-07-02 ENCOUNTER — APPOINTMENT (OUTPATIENT)
Dept: ANTEPARTUM | Facility: CLINIC | Age: 39
End: 2024-07-02
Payer: MEDICAID

## 2024-07-02 ENCOUNTER — APPOINTMENT (OUTPATIENT)
Dept: OBGYN | Facility: CLINIC | Age: 39
End: 2024-07-02
Payer: MEDICAID

## 2024-07-02 ENCOUNTER — ASOB RESULT (OUTPATIENT)
Age: 39
End: 2024-07-02

## 2024-07-02 VITALS
HEIGHT: 64 IN | DIASTOLIC BLOOD PRESSURE: 78 MMHG | SYSTOLIC BLOOD PRESSURE: 118 MMHG | WEIGHT: 240 LBS | BODY MASS INDEX: 40.97 KG/M2

## 2024-07-02 DIAGNOSIS — Z30.09 ENCOUNTER FOR OTHER GENERAL COUNSELING AND ADVICE ON CONTRACEPTION: ICD-10-CM

## 2024-07-02 DIAGNOSIS — R10.2 PELVIC AND PERINEAL PAIN: ICD-10-CM

## 2024-07-02 DIAGNOSIS — B97.7 PAPILLOMAVIRUS AS THE CAUSE OF DISEASES CLASSIFIED ELSEWHERE: ICD-10-CM

## 2024-07-02 PROCEDURE — 99213 OFFICE O/P EST LOW 20 MIN: CPT

## 2024-07-02 PROCEDURE — 76830 TRANSVAGINAL US NON-OB: CPT

## 2024-07-02 PROCEDURE — 76856 US EXAM PELVIC COMPLETE: CPT | Mod: 59

## 2024-08-22 ENCOUNTER — LABORATORY RESULT (OUTPATIENT)
Age: 39
End: 2024-08-22

## 2024-08-22 ENCOUNTER — APPOINTMENT (OUTPATIENT)
Dept: SURGERY | Facility: CLINIC | Age: 39
End: 2024-08-22
Payer: MEDICAID

## 2024-08-22 VITALS
BODY MASS INDEX: 43.5 KG/M2 | DIASTOLIC BLOOD PRESSURE: 80 MMHG | TEMPERATURE: 98.8 F | SYSTOLIC BLOOD PRESSURE: 121 MMHG | HEART RATE: 86 BPM | HEIGHT: 62 IN | WEIGHT: 236.4 LBS | RESPIRATION RATE: 16 BRPM | OXYGEN SATURATION: 96 %

## 2024-08-22 DIAGNOSIS — E66.01 MORBID (SEVERE) OBESITY DUE TO EXCESS CALORIES: ICD-10-CM

## 2024-08-22 DIAGNOSIS — Z01.818 ENCOUNTER FOR OTHER PREPROCEDURAL EXAMINATION: ICD-10-CM

## 2024-08-22 PROCEDURE — 99205 OFFICE O/P NEW HI 60 MIN: CPT

## 2024-08-22 RX ORDER — SEMAGLUTIDE 1.34 MG/ML
4 INJECTION, SOLUTION SUBCUTANEOUS
Refills: 0 | Status: ACTIVE | COMMUNITY

## 2024-08-22 RX ORDER — GLUC/MSM/COLGN2/HYAL/ANTIARTH3 375-375-20
TABLET ORAL
Refills: 0 | Status: ACTIVE | COMMUNITY

## 2024-08-22 RX ORDER — PIOGLITAZONE HYDROCHLORIDE 45 MG/1
TABLET ORAL
Refills: 0 | Status: ACTIVE | COMMUNITY

## 2024-08-22 RX ORDER — ATORVASTATIN CALCIUM 20 MG/1
20 TABLET, FILM COATED ORAL
Refills: 0 | Status: ACTIVE | COMMUNITY

## 2024-08-22 RX ORDER — LOSARTAN POTASSIUM 100 MG/1
100 TABLET, FILM COATED ORAL
Refills: 0 | Status: ACTIVE | COMMUNITY

## 2024-08-22 NOTE — HISTORY OF PRESENT ILLNESS
[de-identified] :  Ms. SAULO MARCANO is a 39 year old morbidly obese woman, 5ft 2 inch, 236.6 lbs, BMI43.2.  The patient presents requesting weight loss surgery. She has been more than 75 lb. overweight for greater than 5 years.   The patient has tried numerous weight loss programs including self-directed and physician supervised diets and self-directed exercise programs. She has lost greater than 10 pounds on more than one occasion, however, has experienced weight regain despite her best efforts with healthy diet and exercise.  The patient's history includes anemia, diabetes, hypertension, cesarian section   mild reflux denies smoking

## 2024-08-22 NOTE — PLAN
[FreeTextEntry1] : The risks, benefits and alternatives of laparoscopic/robotic gastric bypass and sleeve gastrectomy were discussed at length and all questions were answered. The patient appears to understand and wishes to proceed. Plan for robotic sleeve gastrectomy   The patient was given the following instructions:  1.   She needs a complete medical evaluation including pulmonary evaluation.  2.   She needs an upper endoscopy. - GI preform  3.   She needs dietary and psychological evaluations.  4.   She must undergo a right upper abdominal sonogram   5. Home sleep study   The patient clearly understood that surgery would only be scheduled if there are no medical or psychiatric contraindications, and a second office visit is required.  The risks/benefits of weight loss surgery vs nonoperative management were discussed. We discussed the criteria used for eligibility. The patient understands.   I, Dr. Cramer personally performed the evaluation and management (E/M) services for this new patient. That E/M includes conducting the initial examination, assessing all conditions, and establishing the plan of care. Today, my NP, Mohini Garcia, was here to observe my evaluation and management services for this patient to be followed going forward.

## 2024-08-22 NOTE — HISTORY OF PRESENT ILLNESS
[de-identified] :  Ms. SAULO MARCANO is a 39 year old morbidly obese woman, 5ft 2 inch, 236.6 lbs, BMI43.2.  The patient presents requesting weight loss surgery. She has been more than 75 lb. overweight for greater than 5 years.   The patient has tried numerous weight loss programs including self-directed and physician supervised diets and self-directed exercise programs. She has lost greater than 10 pounds on more than one occasion, however, has experienced weight regain despite her best efforts with healthy diet and exercise.  The patient's history includes anemia, diabetes, hypertension, cesarian section   mild reflux denies smoking

## 2024-08-22 NOTE — CONSULT LETTER
[Dear  ___] : Dear  [unfilled], [Consult Letter:] : I had the pleasure of evaluating your patient, [unfilled]. [Please see my note below.] : Please see my note below. [Consult Closing:] : Thank you very much for allowing me to participate in the care of this patient.  If you have any questions, please do not hesitate to contact me. [Sincerely,] : Sincerely, [FreeTextEntry3] : Benny Cramer MD, FACS Director of Bariatric Surgery [facility]  Assistant Professor of Surgery  [employer] [facility] [employer] at [facility]

## 2024-08-22 NOTE — ASSESSMENT
[FreeTextEntry1] :  Ms. SAULO MARCANO is a 39 year old woman with morbid obesity who is unable to lose weight and lower her risk of co-morbid conditions with medical management including diet and exercise. She wishes to proceed with planning for bariatric surgery.

## 2024-08-22 NOTE — CONSULT LETTER
[Dear  ___] : Dear  [unfilled], [Consult Letter:] : I had the pleasure of evaluating your patient, [unfilled]. [Please see my note below.] : Please see my note below. [Consult Closing:] : Thank you very much for allowing me to participate in the care of this patient.  If you have any questions, please do not hesitate to contact me. [Sincerely,] : Sincerely, [FreeTextEntry3] : Benny Cramer MD, FACS Director of Bariatric Surgery [facility]  Assistant Professor of Surgery  [facility] [employer] [facility] [employer] at [facility]

## 2024-08-24 ENCOUNTER — APPOINTMENT (OUTPATIENT)
Dept: ULTRASOUND IMAGING | Facility: CLINIC | Age: 39
End: 2024-08-24
Payer: MEDICAID

## 2024-08-27 ENCOUNTER — APPOINTMENT (OUTPATIENT)
Dept: BARIATRICS | Facility: CLINIC | Age: 39
End: 2024-08-27

## 2024-08-28 LAB
25(OH)D3 SERPL-MCNC: 18.2 NG/ML
ALBUMIN SERPL ELPH-MCNC: 4.3 G/DL
ALP BLD-CCNC: 113 U/L
ALT SERPL-CCNC: 29 U/L
ANION GAP SERPL CALC-SCNC: 14 MMOL/L
APTT BLD: 27.4 SEC
AST SERPL-CCNC: 26 U/L
BASOPHILS # BLD AUTO: 0.02 K/UL
BASOPHILS NFR BLD AUTO: 0.3 %
BILIRUB DIRECT SERPL-MCNC: 0 MG/DL
BILIRUB INDIRECT SERPL-MCNC: 0.1 MG/DL
BILIRUB SERPL-MCNC: 0.2 MG/DL
BUN SERPL-MCNC: 12 MG/DL
CA-I SERPL-SCNC: 4.9 MG/DL
CALCIUM SERPL-MCNC: 8.8 MG/DL
CALCIUM SERPL-MCNC: 8.8 MG/DL
CHLORIDE SERPL-SCNC: 104 MMOL/L
CHOLEST SERPL-MCNC: 101 MG/DL
CO2 SERPL-SCNC: 22 MMOL/L
COPPER SERPL-MCNC: 115 UG/DL
CREAT SERPL-MCNC: 0.48 MG/DL
EGFR: 123 ML/MIN/1.73M2
EOSINOPHIL # BLD AUTO: 0.05 K/UL
EOSINOPHIL NFR BLD AUTO: 0.8 %
ESTIMATED AVERAGE GLUCOSE: 131 MG/DL
FERRITIN SERPL-MCNC: 20 NG/ML
FOLATE SERPL-MCNC: 11.7 NG/ML
GLUCOSE SERPL-MCNC: 84 MG/DL
HBA1C MFR BLD HPLC: 6.2 %
HCG SERPL QL: NEGATIVE
HCT VFR BLD CALC: 39.3 %
HDLC SERPL-MCNC: 29 MG/DL
HGB BLD-MCNC: 11.6 G/DL
IMM GRANULOCYTES NFR BLD AUTO: 0.3 %
INR PPP: 0.98 RATIO
IRON SATN MFR SERPL: 22 %
IRON SERPL-MCNC: 80 UG/DL
LDLC SERPL CALC-MCNC: 53 MG/DL
LYMPHOCYTES # BLD AUTO: 2.07 K/UL
LYMPHOCYTES NFR BLD AUTO: 31.6 %
MAGNESIUM SERPL-MCNC: 2.1 MG/DL
MAN DIFF?: NORMAL
MCHC RBC-ENTMCNC: 24.6 PG
MCHC RBC-ENTMCNC: 29.5 GM/DL
MCV RBC AUTO: 83.3 FL
MENADIONE SERPL-MCNC: 0.23 NG/ML
MONOCYTES # BLD AUTO: 0.61 K/UL
MONOCYTES NFR BLD AUTO: 9.3 %
NEUTROPHILS # BLD AUTO: 3.78 K/UL
NEUTROPHILS NFR BLD AUTO: 57.7 %
NONHDLC SERPL-MCNC: 72 MG/DL
PAPP-A SERPL-ACNC: <1 MIU/ML
PARATHYROID HORMONE INTACT: 28 PG/ML
PHOSPHATE SERPL-MCNC: 4.1 MG/DL
PLATELET # BLD AUTO: 270 K/UL
POTASSIUM SERPL-SCNC: 4.2 MMOL/L
PREALB SERPL NEPH-MCNC: 19 MG/DL
PROT SERPL-MCNC: 7.7 G/DL
PT BLD: 11.1 SEC
RBC # BLD: 4.72 M/UL
RBC # FLD: 22.9 %
SODIUM SERPL-SCNC: 140 MMOL/L
T4 SERPL-MCNC: 9.5 UG/DL
TIBC SERPL-MCNC: 358 UG/DL
TRIGL SERPL-MCNC: 97 MG/DL
TSH SERPL-ACNC: 1.96 UIU/ML
UIBC SERPL-MCNC: 279 UG/DL
VIT A SERPL-MCNC: 31.6 UG/DL
VIT B1 SERPL-MCNC: 125.7 NMOL/L
VIT B12 SERPL-MCNC: 467 PG/ML
VIT B2 SERPL-MCNC: 261 UG/L
WBC # FLD AUTO: 6.55 K/UL
ZINC SERPL-MCNC: 60 UG/DL

## 2024-08-29 DIAGNOSIS — Z98.84 BARIATRIC SURGERY STATUS: ICD-10-CM

## 2024-08-29 LAB — VIT C SERPL-MCNC: 0.4 MG/DL

## 2024-08-30 LAB — VIT B6 SERPL-MCNC: 13.9 UG/L

## 2024-09-01 LAB
A-TOCOPHEROL VIT E SERPL-MCNC: 7.8 MG/L
BETA+GAMMA TOCOPHEROL SERPL-MCNC: 1.3 MG/L

## 2024-09-18 PROCEDURE — 76700 US EXAM ABDOM COMPLETE: CPT | Mod: 26

## 2024-09-27 ENCOUNTER — APPOINTMENT (OUTPATIENT)
Dept: PULMONOLOGY | Facility: CLINIC | Age: 39
End: 2024-09-27
Payer: MEDICAID

## 2024-09-27 ENCOUNTER — APPOINTMENT (OUTPATIENT)
Dept: BARIATRICS | Facility: CLINIC | Age: 39
End: 2024-09-27

## 2024-09-27 VITALS
HEART RATE: 85 BPM | OXYGEN SATURATION: 98 % | RESPIRATION RATE: 16 BRPM | SYSTOLIC BLOOD PRESSURE: 130 MMHG | DIASTOLIC BLOOD PRESSURE: 84 MMHG

## 2024-09-27 DIAGNOSIS — G47.33 OBSTRUCTIVE SLEEP APNEA (ADULT) (PEDIATRIC): ICD-10-CM

## 2024-09-27 DIAGNOSIS — E66.01 MORBID (SEVERE) OBESITY DUE TO EXCESS CALORIES: ICD-10-CM

## 2024-09-27 PROCEDURE — 99213 OFFICE O/P EST LOW 20 MIN: CPT

## 2024-09-27 NOTE — CONSULT LETTER
[Dear  ___] : Dear  [unfilled], [Consult Letter:] : I had the pleasure of evaluating your patient, [unfilled]. [Please see my note below.] : Please see my note below. [Consult Closing:] : Thank you very much for allowing me to participate in the care of this patient.  If you have any questions, please do not hesitate to contact me. [Sincerely,] : Sincerely, [DrDevonte  ___] : Dr. RYAN

## 2024-09-27 NOTE — PHYSICAL EXAM
[No Acute Distress] : no acute distress [Elongated Uvula] : elongated uvula [Enlarged Base of the Tongue] : enlarged base of the tongue [II] : Mallampati Class: II [Normal Appearance] : normal appearance [Neck Circumference: ___] : neck circumference: [unfilled] [No Neck Mass] : no neck mass [Normal Rate/Rhythm] : normal rate/rhythm [Normal S1, S2] : normal s1, s2 [No Resp Distress] : no resp distress [Clear to Auscultation Bilaterally] : clear to auscultation bilaterally [No Clubbing] : no clubbing [No Cyanosis] : no cyanosis

## 2024-09-27 NOTE — DISCUSSION/SUMMARY
[FreeTextEntry1] : IMP  Obesity Severe CODY  Plan  Weight loss APAP via F30i FFM 2.5 month FU with mejia

## 2024-09-27 NOTE — HISTORY OF PRESENT ILLNESS
[TextBox_4] : 9/27/24  39 year old morbidly obese woman, 5ft 2 inch, 236.6 lbs, BMI43.2. The patient presents requesting pulmonary and sleep clearance for weight loss surgery. She has been more than 75 lb. overweight for greater than 5 years.  The patient has tried numerous weight loss programs including self-directed and physician supervised diets and self-directed exercise programs. She has lost greater than 10 pounds on more than one occasion, however, has experienced weight regain despite her best efforts with healthy diet and exercise.  The patient's history includes anemia, diabetes, hypertension, cesarian section  mild reflux denies smoking.

## 2024-10-25 PROCEDURE — 97803 MED NUTRITION INDIV SUBSEQ: CPT | Mod: 95

## 2024-10-25 PROCEDURE — T1013A: CUSTOM
